# Patient Record
Sex: MALE | Race: WHITE | NOT HISPANIC OR LATINO | URBAN - METROPOLITAN AREA
[De-identification: names, ages, dates, MRNs, and addresses within clinical notes are randomized per-mention and may not be internally consistent; named-entity substitution may affect disease eponyms.]

---

## 2018-03-21 ENCOUNTER — INPATIENT (INPATIENT)
Facility: HOSPITAL | Age: 68
LOS: 0 days | Discharge: HOME | DRG: 352 | End: 2018-03-22
Attending: SPECIALIST | Admitting: SPECIALIST
Payer: COMMERCIAL

## 2018-03-21 VITALS
RESPIRATION RATE: 16 BRPM | OXYGEN SATURATION: 98 % | SYSTOLIC BLOOD PRESSURE: 108 MMHG | HEART RATE: 63 BPM | WEIGHT: 184.97 LBS | HEIGHT: 71 IN | DIASTOLIC BLOOD PRESSURE: 70 MMHG | TEMPERATURE: 99 F

## 2018-03-21 DIAGNOSIS — J44.9 CHRONIC OBSTRUCTIVE PULMONARY DISEASE, UNSPECIFIED: ICD-10-CM

## 2018-03-21 DIAGNOSIS — K40.30 UNILATERAL INGUINAL HERNIA, WITH OBSTRUCTION, WITHOUT GANGRENE, NOT SPECIFIED AS RECURRENT: ICD-10-CM

## 2018-03-21 DIAGNOSIS — K59.00 CONSTIPATION, UNSPECIFIED: ICD-10-CM

## 2018-03-21 DIAGNOSIS — Z98.890 OTHER SPECIFIED POSTPROCEDURAL STATES: Chronic | ICD-10-CM

## 2018-03-21 DIAGNOSIS — F20.3 UNDIFFERENTIATED SCHIZOPHRENIA: ICD-10-CM

## 2018-03-21 DIAGNOSIS — N40.0 BENIGN PROSTATIC HYPERPLASIA WITHOUT LOWER URINARY TRACT SYMPTOMS: ICD-10-CM

## 2018-03-21 DIAGNOSIS — K21.9 GASTRO-ESOPHAGEAL REFLUX DISEASE WITHOUT ESOPHAGITIS: ICD-10-CM

## 2018-03-21 DIAGNOSIS — J34.2 DEVIATED NASAL SEPTUM: Chronic | ICD-10-CM

## 2018-03-21 LAB
ALBUMIN SERPL ELPH-MCNC: 4.1 G/DL — SIGNIFICANT CHANGE UP (ref 3.3–5)
ALP SERPL-CCNC: 77 U/L — SIGNIFICANT CHANGE UP (ref 40–120)
ALT FLD-CCNC: 18 U/L — SIGNIFICANT CHANGE UP (ref 12–78)
ANION GAP SERPL CALC-SCNC: 9 MMOL/L — SIGNIFICANT CHANGE UP (ref 5–17)
APTT BLD: 30.4 SEC — SIGNIFICANT CHANGE UP (ref 27.5–37.4)
AST SERPL-CCNC: 22 U/L — SIGNIFICANT CHANGE UP (ref 15–37)
BASOPHILS # BLD AUTO: 0.2 K/UL — SIGNIFICANT CHANGE UP (ref 0–0.2)
BASOPHILS NFR BLD AUTO: 2 % — SIGNIFICANT CHANGE UP (ref 0–2)
BILIRUB SERPL-MCNC: 0.7 MG/DL — SIGNIFICANT CHANGE UP (ref 0.2–1.2)
BUN SERPL-MCNC: 31 MG/DL — HIGH (ref 7–23)
CALCIUM SERPL-MCNC: 9.2 MG/DL — SIGNIFICANT CHANGE UP (ref 8.5–10.1)
CHLORIDE SERPL-SCNC: 105 MMOL/L — SIGNIFICANT CHANGE UP (ref 96–108)
CO2 SERPL-SCNC: 26 MMOL/L — SIGNIFICANT CHANGE UP (ref 22–31)
CREAT SERPL-MCNC: 0.81 MG/DL — SIGNIFICANT CHANGE UP (ref 0.5–1.3)
EOSINOPHIL # BLD AUTO: 0.6 K/UL — HIGH (ref 0–0.5)
EOSINOPHIL NFR BLD AUTO: 7.3 % — HIGH (ref 0–6)
GLUCOSE SERPL-MCNC: 84 MG/DL — SIGNIFICANT CHANGE UP (ref 70–99)
HCT VFR BLD CALC: 38.2 % — LOW (ref 39–50)
HGB BLD-MCNC: 12 G/DL — LOW (ref 13–17)
INR BLD: 0.94 RATIO — SIGNIFICANT CHANGE UP (ref 0.88–1.16)
LACTATE SERPL-SCNC: 1.5 MMOL/L — SIGNIFICANT CHANGE UP (ref 0.7–2)
LIDOCAIN IGE QN: 162 U/L — SIGNIFICANT CHANGE UP (ref 73–393)
LYMPHOCYTES # BLD AUTO: 2.6 K/UL — SIGNIFICANT CHANGE UP (ref 1–3.3)
LYMPHOCYTES # BLD AUTO: 31.4 % — SIGNIFICANT CHANGE UP (ref 13–44)
MCHC RBC-ENTMCNC: 31.3 GM/DL — LOW (ref 32–36)
MCHC RBC-ENTMCNC: 31.8 PG — SIGNIFICANT CHANGE UP (ref 27–34)
MCV RBC AUTO: 101.6 FL — HIGH (ref 80–100)
MONOCYTES # BLD AUTO: 0.9 K/UL — SIGNIFICANT CHANGE UP (ref 0–0.9)
MONOCYTES NFR BLD AUTO: 11 % — HIGH (ref 1–9)
NEUTROPHILS # BLD AUTO: 4 K/UL — SIGNIFICANT CHANGE UP (ref 1.8–7.4)
NEUTROPHILS NFR BLD AUTO: 48.4 % — SIGNIFICANT CHANGE UP (ref 43–77)
PLATELET # BLD AUTO: 221 K/UL — SIGNIFICANT CHANGE UP (ref 150–400)
POTASSIUM SERPL-MCNC: 4.6 MMOL/L — SIGNIFICANT CHANGE UP (ref 3.5–5.3)
POTASSIUM SERPL-SCNC: 4.6 MMOL/L — SIGNIFICANT CHANGE UP (ref 3.5–5.3)
PROT SERPL-MCNC: 7.7 G/DL — SIGNIFICANT CHANGE UP (ref 6–8.3)
PROTHROM AB SERPL-ACNC: 10.2 SEC — SIGNIFICANT CHANGE UP (ref 9.8–12.7)
RBC # BLD: 3.76 M/UL — LOW (ref 4.2–5.8)
RBC # FLD: 16.3 % — HIGH (ref 10.3–14.5)
SODIUM SERPL-SCNC: 140 MMOL/L — SIGNIFICANT CHANGE UP (ref 135–145)
WBC # BLD: 8.2 K/UL — SIGNIFICANT CHANGE UP (ref 3.8–10.5)
WBC # FLD AUTO: 8.2 K/UL — SIGNIFICANT CHANGE UP (ref 3.8–10.5)

## 2018-03-21 PROCEDURE — 99285 EMERGENCY DEPT VISIT HI MDM: CPT

## 2018-03-21 PROCEDURE — 71045 X-RAY EXAM CHEST 1 VIEW: CPT | Mod: 26

## 2018-03-21 PROCEDURE — 49521 REREPAIR ING HERNIA BLOCKED: CPT | Mod: AS

## 2018-03-21 PROCEDURE — 74176 CT ABD & PELVIS W/O CONTRAST: CPT | Mod: 26

## 2018-03-21 PROCEDURE — 93010 ELECTROCARDIOGRAM REPORT: CPT

## 2018-03-21 PROCEDURE — 49521 REREPAIR ING HERNIA BLOCKED: CPT | Mod: GC

## 2018-03-21 RX ORDER — TOPIRAMATE 25 MG
100 TABLET ORAL
Qty: 0 | Refills: 0 | Status: DISCONTINUED | OUTPATIENT
Start: 2018-03-21 | End: 2018-03-22

## 2018-03-21 RX ORDER — FAMOTIDINE 10 MG/ML
40 INJECTION INTRAVENOUS AT BEDTIME
Qty: 0 | Refills: 0 | Status: DISCONTINUED | OUTPATIENT
Start: 2018-03-21 | End: 2018-03-22

## 2018-03-21 RX ORDER — SODIUM CHLORIDE 9 MG/ML
1000 INJECTION, SOLUTION INTRAVENOUS
Qty: 0 | Refills: 0 | Status: DISCONTINUED | OUTPATIENT
Start: 2018-03-21 | End: 2018-03-21

## 2018-03-21 RX ORDER — HYDROMORPHONE HYDROCHLORIDE 2 MG/ML
0.5 INJECTION INTRAMUSCULAR; INTRAVENOUS; SUBCUTANEOUS
Qty: 0 | Refills: 0 | Status: DISCONTINUED | OUTPATIENT
Start: 2018-03-21 | End: 2018-03-21

## 2018-03-21 RX ORDER — ENOXAPARIN SODIUM 100 MG/ML
40 INJECTION SUBCUTANEOUS DAILY
Qty: 0 | Refills: 0 | Status: DISCONTINUED | OUTPATIENT
Start: 2018-03-22 | End: 2018-03-22

## 2018-03-21 RX ORDER — ACETAMINOPHEN 500 MG
1000 TABLET ORAL ONCE
Qty: 0 | Refills: 0 | Status: COMPLETED | OUTPATIENT
Start: 2018-03-22 | End: 2018-03-22

## 2018-03-21 RX ORDER — OLANZAPINE 15 MG/1
20 TABLET, FILM COATED ORAL AT BEDTIME
Qty: 0 | Refills: 0 | Status: DISCONTINUED | OUTPATIENT
Start: 2018-03-21 | End: 2018-03-22

## 2018-03-21 RX ORDER — KETOROLAC TROMETHAMINE 30 MG/ML
15 SYRINGE (ML) INJECTION EVERY 6 HOURS
Qty: 0 | Refills: 0 | Status: DISCONTINUED | OUTPATIENT
Start: 2018-03-21 | End: 2018-03-22

## 2018-03-21 RX ORDER — ONDANSETRON 8 MG/1
4 TABLET, FILM COATED ORAL EVERY 6 HOURS
Qty: 0 | Refills: 0 | Status: DISCONTINUED | OUTPATIENT
Start: 2018-03-21 | End: 2018-03-22

## 2018-03-21 RX ORDER — OXYCODONE HYDROCHLORIDE 5 MG/1
10 TABLET ORAL EVERY 6 HOURS
Qty: 0 | Refills: 0 | Status: DISCONTINUED | OUTPATIENT
Start: 2018-03-21 | End: 2018-03-21

## 2018-03-21 RX ORDER — TAMSULOSIN HYDROCHLORIDE 0.4 MG/1
0.4 CAPSULE ORAL AT BEDTIME
Qty: 0 | Refills: 0 | Status: DISCONTINUED | OUTPATIENT
Start: 2018-03-21 | End: 2018-03-22

## 2018-03-21 RX ORDER — ACETAMINOPHEN 500 MG
1000 TABLET ORAL ONCE
Qty: 0 | Refills: 0 | Status: COMPLETED | OUTPATIENT
Start: 2018-03-21 | End: 2018-03-21

## 2018-03-21 RX ORDER — PANTOPRAZOLE SODIUM 20 MG/1
40 TABLET, DELAYED RELEASE ORAL ONCE
Qty: 0 | Refills: 0 | Status: COMPLETED | OUTPATIENT
Start: 2018-03-21 | End: 2018-03-21

## 2018-03-21 RX ORDER — PIPERACILLIN AND TAZOBACTAM 4; .5 G/20ML; G/20ML
3.38 INJECTION, POWDER, LYOPHILIZED, FOR SOLUTION INTRAVENOUS ONCE
Qty: 0 | Refills: 0 | Status: DISCONTINUED | OUTPATIENT
Start: 2018-03-21 | End: 2018-03-21

## 2018-03-21 RX ORDER — OXYCODONE HYDROCHLORIDE 5 MG/1
5 TABLET ORAL EVERY 4 HOURS
Qty: 0 | Refills: 0 | Status: DISCONTINUED | OUTPATIENT
Start: 2018-03-21 | End: 2018-03-21

## 2018-03-21 RX ORDER — MECLIZINE HCL 12.5 MG
12.5 TABLET ORAL DAILY
Qty: 0 | Refills: 0 | Status: DISCONTINUED | OUTPATIENT
Start: 2018-03-21 | End: 2018-03-22

## 2018-03-21 RX ORDER — DOCUSATE SODIUM 100 MG
100 CAPSULE ORAL
Qty: 0 | Refills: 0 | Status: DISCONTINUED | OUTPATIENT
Start: 2018-03-21 | End: 2018-03-22

## 2018-03-21 RX ORDER — OLANZAPINE 15 MG/1
10 TABLET, FILM COATED ORAL DAILY
Qty: 0 | Refills: 0 | Status: DISCONTINUED | OUTPATIENT
Start: 2018-03-21 | End: 2018-03-22

## 2018-03-21 RX ORDER — SENNA PLUS 8.6 MG/1
2 TABLET ORAL AT BEDTIME
Qty: 0 | Refills: 0 | Status: DISCONTINUED | OUTPATIENT
Start: 2018-03-21 | End: 2018-03-22

## 2018-03-21 RX ORDER — PIPERACILLIN AND TAZOBACTAM 4; .5 G/20ML; G/20ML
3.38 INJECTION, POWDER, LYOPHILIZED, FOR SOLUTION INTRAVENOUS ONCE
Qty: 0 | Refills: 0 | Status: COMPLETED | OUTPATIENT
Start: 2018-03-21 | End: 2018-03-21

## 2018-03-21 RX ADMIN — SENNA PLUS 2 TABLET(S): 8.6 TABLET ORAL at 21:45

## 2018-03-21 RX ADMIN — Medication 15 MILLIGRAM(S): at 23:54

## 2018-03-21 RX ADMIN — Medication 100 MILLIGRAM(S): at 21:45

## 2018-03-21 RX ADMIN — OLANZAPINE 10 MILLIGRAM(S): 15 TABLET, FILM COATED ORAL at 14:18

## 2018-03-21 RX ADMIN — TAMSULOSIN HYDROCHLORIDE 0.4 MILLIGRAM(S): 0.4 CAPSULE ORAL at 14:20

## 2018-03-21 RX ADMIN — Medication 400 MILLIGRAM(S): at 20:07

## 2018-03-21 RX ADMIN — Medication 15 MILLIGRAM(S): at 17:22

## 2018-03-21 RX ADMIN — Medication 100 MILLIGRAM(S): at 17:32

## 2018-03-21 RX ADMIN — PIPERACILLIN AND TAZOBACTAM 200 GRAM(S): 4; .5 INJECTION, POWDER, LYOPHILIZED, FOR SOLUTION INTRAVENOUS at 09:35

## 2018-03-21 RX ADMIN — SODIUM CHLORIDE 100 MILLILITER(S): 9 INJECTION, SOLUTION INTRAVENOUS at 11:56

## 2018-03-21 RX ADMIN — HYDROMORPHONE HYDROCHLORIDE 0.5 MILLIGRAM(S): 2 INJECTION INTRAMUSCULAR; INTRAVENOUS; SUBCUTANEOUS at 12:34

## 2018-03-21 RX ADMIN — OLANZAPINE 20 MILLIGRAM(S): 15 TABLET, FILM COATED ORAL at 21:45

## 2018-03-21 RX ADMIN — Medication 100 MILLIGRAM(S): at 14:18

## 2018-03-21 RX ADMIN — FAMOTIDINE 40 MILLIGRAM(S): 10 INJECTION INTRAVENOUS at 21:45

## 2018-03-21 RX ADMIN — Medication 1000 MILLIGRAM(S): at 20:22

## 2018-03-21 RX ADMIN — Medication 15 MILLIGRAM(S): at 16:10

## 2018-03-21 NOTE — H&P ADULT - NSHPPHYSICALEXAM_GEN_ALL_CORE
PHYSICAL EXAM:  GENERAL: NAD, well-groomed, well-developed  HEAD:  Atraumatic, Normocephalic    HEART: Regular rate and rhythm; (+) ejection murmur- RSB, LSB, no rubs, or gallops  RESPIRATORY: CTA B/L, No W/R/R  ABDOMEN: Soft, Nontender, Nondistended; Bowel sounds present  NEUROLOGY: A&Ox3  EXTREMITIES:  2+ Peripheral Pulses, No clubbing, cyanosis, or edema  SKIN: warm, dry, normal color, no rash or abnormal lesions PHYSICAL EXAM:  GENERAL: NAD, well-groomed, well-developed  HEAD:  Atraumatic, Normocephalic  HEART: Regular rate and rhythm; (+) ejection murmur- RSB, LSB, no rubs, or gallops  RESPIRATORY: CTA B/L, No W/R/R  ABDOMEN: Soft, Nontender, Nondistended; Bowel sounds present.  GROIN: (+) bulge/incarcerated Right inguinal hernia, irreducible. (+) ttp to Right inguinal region. Left groin appears normal.  Calves soft bilaterally, no ttp.  NEUROLOGY: A&Ox3  EXTREMITIES:  2+ Peripheral Pulses, No clubbing, cyanosis, or edema  SKIN: warm, dry, normal color, no rash or abnormal lesions

## 2018-03-21 NOTE — CONSULT NOTE ADULT - SUBJECTIVE AND OBJECTIVE BOX
AranzaWai kent is a 67 Y.O. M transferred form [facility] rehab because of right inguina non reducible lump with pain to [place] ED.   Patient has incarcerated Right inguinal hernia.  Patient reports he had Right inguinal hernia repair ~4-5 years ago at [facility]. Patient reports he has had worsening pain over the past week. Patient denies any bowel or bladder changes but does report a history of chronic constipation. Patient denies any nausea/vomiting. Denies any recent CP, SOB, Fever/chills/dysuria. Patient was admitted for "PNA vs. CP" to [facility] in Jan 2018 and was on a "vent". Pt unsure (poor historian) if cardiac imaging or interventional studies were performed.   PMH significant for Schizophrenia, BPH, GERD, Ischemic Heart disease, Respiratory failure, COPD.  He is on O2 and inhalers PRN basis.

## 2018-03-21 NOTE — CONSULT NOTE ADULT - ATTENDING COMMENTS
Patient is mild to moderate cardiac risk candidate for surgical procedure and cleared for emergent surgery.

## 2018-03-21 NOTE — ED PROVIDER NOTE - GASTROINTESTINAL, MLM
Abdomen soft, non-tender, no guarding. +right bulging inguinal hernia with mild erythema, irreducible

## 2018-03-21 NOTE — H&P ADULT - HISTORY OF PRESENT ILLNESS
67 Y.O. M with PMHx significant for Schizophrenia, BPH, GERD, Ischemic Heart disease, Respiratory failure, COPD- on O2 prn/inhalers presently- presents to [place] ED with incarcerated Right inguinal hernia. Pt reports he had Right inguinal hernia repair ~4-5 years ago at [facility]. Pt reports he has had worsening pain over the past week. Pt denies any bowel or bladder changes but does report a history of chronic constipation. Pt denies any nausea/vomiting. Denies any recent CP, SOB, Fever/chills/dysuria. Pt reports he was admitted for "PNA vs. CP" to [facility] in Jan 2018 and was on a "vent". Pt unsure (poor historian) if cardiac imaging or interventional studies were performed.     PMD- Dr. Cheikh Rivas [phone] 67 Y.O. M with PMHx significant for Schizophrenia, BPH, GERD, Ischemic Heart disease, Respiratory failure, COPD- on O2 prn/inhalers presently- presents to [place] ED with incarcerated Right inguinal hernia. Pt reports he had Right inguinal hernia repair ~4-5 years ago at [facility]. Pt reports he has had worsening pain over the past week. Pt denies any bowel or bladder changes but does report a history of chronic constipation. Pt denies any nausea/vomiting. Denies any recent CP, SOB, Fever/chills/dysuria. Pt reports he was admitted for "PNA vs. CP" to [facility] in Jan 2018 and was on a "vent". Pt unsure (poor historian) if cardiac imaging or interventional studies were performed.  Pt lives in residential facility [facility].    PMD- Dr. Cheikh Rivas [phone] 67 Y.O. M with PMHx significant for Schizophrenia, BPH, GERD, Ischemic Heart disease, Respiratory failure, COPD- on O2 prn but no inhalers presently- presents to [place] ED with incarcerated Right inguinal hernia. Pt reports he had Right inguinal hernia repair ~4-5 years ago at [facility]. Pt reports he has had worsening pain over the past week. Pt denies any bowel or bladder changes but does report a history of chronic constipation. Pt denies any nausea/vomiting. Denies any recent CP, SOB, Fever/chills/dysuria. Pt reports he was admitted for "PNA vs. CP" to [facility] in Jan 2018 and was on a "vent". Pt unsure (poor historian) if cardiac imaging or interventional studies were performed.  Pt lives in residential facility [facility].    PMD- Dr. Cheikh Rivas [phone]

## 2018-03-21 NOTE — CONSULT NOTE ADULT - PROBLEM SELECTOR RECOMMENDATION 9
Surgical admission and Patient is mild to moderate cardiac risk candidate for surgical procedure and cleared for emergent surgery.

## 2018-03-21 NOTE — H&P ADULT - PSH
Deviated septum    H/O hernia repair  right inguinal & ? left  History of excision of pilonidal cyst    History of tonsillectomy

## 2018-03-21 NOTE — ED PROVIDER NOTE - OBJECTIVE STATEMENT
68 yo male hx of right inguinal hernia (had surgery 5-6 years ago but doesn't remember surgeon name from [place]) sent from [facility] c/o right inguinal pain x 1 week getting progressively worse.  No fever/chills, no nausea/vomiting, worse with standing.  Noticed a bulge a few days ago.

## 2018-03-21 NOTE — H&P ADULT - PMH
BPH (benign prostatic hyperplasia)    CAD (coronary artery disease)    Constipation    COPD (chronic obstructive pulmonary disease)    GERD (gastroesophageal reflux disease)    Inguinal hernia    Respiratory failure    Schizophrenia    Vertigo

## 2018-03-21 NOTE — BRIEF OPERATIVE NOTE - POST-OP DX
Inguinal hernia of right side with obstruction  03/21/2018  INCARCERATED CECUM  Active  Guadalupe Fan

## 2018-03-21 NOTE — BRIEF OPERATIVE NOTE - PROCEDURE
<<-----Click on this checkbox to enter Procedure Inguinal hernia repair, right  03/21/2018  WITH MESH AND PLUG  Active  HNOOROLLAH  Laparotomy  03/21/2018  EXPLORATION AND REDUCTION INCARCERATED CECUM, REPAIR SEROSAL TEAR OF CECUM  Active  HNOOROLLAH

## 2018-03-21 NOTE — H&P ADULT - NSHPREVIEWOFSYSTEMS_GEN_ALL_CORE
REVIEW OF SYSTEM:  CONSTITUTIONAL: No weakness, fevers or chills  EYES/ENT: No visual changes;  No vertigo or throat pain.  NECK: No pain or stiffness  RESPIRATORY: No cough, wheezing, hemoptysis; No shortness of breath  CARDIOVASCULAR: No chest pain or palpitations  GASTROINTESTINAL: No abdominal or epigastric pain. No nausea, vomiting, or hematemesis; No diarrhea. (+) constipation. No melena or hematochezia.  GENITOURINARY: No dysuria, frequency or hematuria  GROIN: (+) bulge/incarcerated Right inguinal hernia, irreducible. (+) ttp to Right inguinal region. Left groin appears normal.  Calves soft bilaterally, no ttp. REVIEW OF SYSTEM:  CONSTITUTIONAL: No weakness, fevers or chills  EYES/ENT: No visual changes;  No vertigo or throat pain.  NECK: No pain or stiffness  RESPIRATORY: No cough, wheezing, hemoptysis; No shortness of breath  CARDIOVASCULAR: No chest pain or palpitations  GASTROINTESTINAL: No abdominal or epigastric pain. No nausea, vomiting, or hematemesis; No diarrhea. (+) constipation. No melena or hematochezia.  GENITOURINARY: No dysuria, frequency or hematuria

## 2018-03-21 NOTE — H&P ADULT - ASSESSMENT
67 Y.O. PMHX as above with Right incarcerated inguinal hernia to the OR with Dr. Magdaleno  1) NPO w/IVF  2) Ancef 2gm PREOP

## 2018-03-21 NOTE — CONSULT NOTE ADULT - ASSESSMENT
Patient A-O X 3, he walks with walker.   Denied any chest pain and exertional dyspnea.  The [facility] for unresponsive ness.  According to the patient he was intubated and he was on ventilator.   According to the patient one of doctor there told him that he may have slight MI but he does not recall that he had any cardiac cath or stress test.  Patient was transferred to [facility] rehabilitation [facility].  Patient is mild to moderate cardiac risk candidate for surgical procedure and cleared for emergent surgery.

## 2018-03-21 NOTE — BRIEF OPERATIVE NOTE - OPERATION/FINDINGS
INCARCERATED CECUM IN RIGHT INGUINA DEFECT , SMALL CECAL SEROSAL TEAR,   DIRECT AND INDIRECT RIGHT INGUINAL HERNIA

## 2018-03-21 NOTE — PROGRESS NOTE ADULT - SUBJECTIVE AND OBJECTIVE BOX
Transversis Abdominal Plane Block Note:    Consent obtained preop, site marked.      Risks, benefits and alternatives discussed. Risks included but were not limited to infection, local anesthetic toxicity, permanent or temporary nerve damage or injury, failed or partial block, catheter dislodgement (if one used,) bleeding, vascular injury, hematoma.    Patient verbally stated they understood and agreed to proceed with the block.      Procedure:    Time out performed, sterile abdominal prep with chlorhexidine and drape,  ultrasound-guided, 21G 4" stimuplex needle advanced towards  _Right____ mid-axiallary line, 1-2 cm superior to ileac crest; good visualization of needle and muscles at all times;  20 cc of 0..5% Ropivacaine with 5mg of decadron injected easily, no heme after aspirating every 5cc, no intraneural injection, no paresthesia, good visualization of local anesthetic spread.  Procedure well tolerated without complications.

## 2018-03-21 NOTE — ED ADULT NURSE NOTE - OBJECTIVE STATEMENT
Pt was sent by [facility] for evaluation of right inguinal hernia - states its been bothering him for about 1 week worsening yesterday

## 2018-03-21 NOTE — ED ADULT NURSE NOTE - PMH
BPH (benign prostatic hyperplasia)    CAD (coronary artery disease)    Constipation    GERD (gastroesophageal reflux disease)    Inguinal hernia    Respiratory failure    Schizophrenia    Vertigo

## 2018-03-22 VITALS
HEART RATE: 68 BPM | OXYGEN SATURATION: 97 % | TEMPERATURE: 98 F | SYSTOLIC BLOOD PRESSURE: 92 MMHG | DIASTOLIC BLOOD PRESSURE: 48 MMHG | RESPIRATION RATE: 16 BRPM

## 2018-03-22 DIAGNOSIS — F25.0 SCHIZOAFFECTIVE DISORDER, BIPOLAR TYPE: ICD-10-CM

## 2018-03-22 PROCEDURE — 85027 COMPLETE CBC AUTOMATED: CPT

## 2018-03-22 PROCEDURE — 99285 EMERGENCY DEPT VISIT HI MDM: CPT | Mod: 25

## 2018-03-22 PROCEDURE — 86901 BLOOD TYPING SEROLOGIC RH(D): CPT

## 2018-03-22 PROCEDURE — 93005 ELECTROCARDIOGRAM TRACING: CPT

## 2018-03-22 PROCEDURE — 83690 ASSAY OF LIPASE: CPT

## 2018-03-22 PROCEDURE — C1781: CPT

## 2018-03-22 PROCEDURE — 86850 RBC ANTIBODY SCREEN: CPT

## 2018-03-22 PROCEDURE — 80053 COMPREHEN METABOLIC PANEL: CPT

## 2018-03-22 PROCEDURE — 83605 ASSAY OF LACTIC ACID: CPT

## 2018-03-22 PROCEDURE — 71045 X-RAY EXAM CHEST 1 VIEW: CPT

## 2018-03-22 PROCEDURE — 82962 GLUCOSE BLOOD TEST: CPT

## 2018-03-22 PROCEDURE — 85730 THROMBOPLASTIN TIME PARTIAL: CPT

## 2018-03-22 PROCEDURE — 49521 REREPAIR ING HERNIA BLOCKED: CPT

## 2018-03-22 PROCEDURE — 85610 PROTHROMBIN TIME: CPT

## 2018-03-22 PROCEDURE — 74176 CT ABD & PELVIS W/O CONTRAST: CPT

## 2018-03-22 PROCEDURE — 86900 BLOOD TYPING SEROLOGIC ABO: CPT

## 2018-03-22 RX ORDER — DOCUSATE SODIUM 100 MG
100 CAPSULE ORAL THREE TIMES A DAY
Qty: 0 | Refills: 0 | Status: DISCONTINUED | OUTPATIENT
Start: 2018-03-22 | End: 2018-03-22

## 2018-03-22 RX ADMIN — Medication 100 MILLIGRAM(S): at 06:51

## 2018-03-22 RX ADMIN — ENOXAPARIN SODIUM 40 MILLIGRAM(S): 100 INJECTION SUBCUTANEOUS at 12:23

## 2018-03-22 RX ADMIN — OLANZAPINE 10 MILLIGRAM(S): 15 TABLET, FILM COATED ORAL at 06:51

## 2018-03-22 RX ADMIN — Medication 1 TABLET(S): at 06:51

## 2018-03-22 RX ADMIN — Medication 15 MILLIGRAM(S): at 00:09

## 2018-03-22 RX ADMIN — Medication 12.5 MILLIGRAM(S): at 06:51

## 2018-03-22 RX ADMIN — Medication 1000 MILLIGRAM(S): at 04:32

## 2018-03-22 RX ADMIN — Medication 400 MILLIGRAM(S): at 04:17

## 2018-03-22 RX ADMIN — TAMSULOSIN HYDROCHLORIDE 0.4 MILLIGRAM(S): 0.4 CAPSULE ORAL at 12:22

## 2018-03-22 NOTE — PROGRESS NOTE ADULT - SUBJECTIVE AND OBJECTIVE BOX
Post Op Day 1 of Anesthesia Pain Management Service    SUBJECTIVE: doing well  		  OBJECTIVE:   Pain Score: 0  /10  Therapy:	[ ] IV PCA	[ ] Epidural   [ ] s/p Spinal Opioid	[x ] Peripheral nerve block (TAP)  	  Vital Signs Last 24 Hrs  T(C): 36.7 (22 Mar 2018 07:11), Max: 36.9 (21 Mar 2018 23:58)  T(F): 98.1 (22 Mar 2018 07:11), Max: 98.4 (21 Mar 2018 23:58)  HR: 68 (22 Mar 2018 07:11) (68 - 90)  BP: 92/48 (22 Mar 2018 07:11) (92/48 - 171/66)  BP(mean): --  RR: 16 (22 Mar 2018 07:11) (12 - 17)  SpO2: 97% (22 Mar 2018 07:11) (96% - 100%)    (x ) Alert & Oriented     (x ) No motor/sensory block     ( -) Nausea     (- ) Pruritis     (- ) Headache      (x ) Further Pain Rx Plan:  Oral Pain Medications prn

## 2018-03-22 NOTE — DISCHARGE NOTE ADULT - HOSPITAL COURSE
67 Y.O. M who presented to [place] ED with Right groin pain. Examination revealed incarcerated inguinal hernia and CT Abd/pelvis revealed hernia containing bowel (cecum) with suspicion for compromise of bowel integrity. Pt was taken to the OR emergently and exploratory laparotomy was performed, cecum was reduced back into the abdomen but was found to be viable. Right inguinal hernia was repaired with mesh. Postop, pt received DVT prophylaxis with lovenox. Pt received adequate analgesia. Pt was given prophylactic antibiotics per protocol. Pt remained hemodynamically stable throughout hospital stay and vital signs were stable. Pt is stable for discharge to [facility] facility with follow up with Dr. Magdaleno in 1 week and Dr. Jj for outpatient colonoscopy. 67 Y.O. M who presented to [place] ED with intractable Right groin pain and buldge. Examination revealed incarcerated inguinal hernia and CT Abd/pelvis revealed hernia containing bowel (cecum) with suspicion for compromise of bowel integrity/possible perforation. Pt was taken to the OR emergently and exploratory laparotomy was performed, cecum was reduced back into the abdomen but was found to be viable. Right inguinal hernia was repaired with mesh. Postop, pt received DVT prophylaxis with lovenox. Pt received adequate analgesia. Pt was given prophylactic antibiotics per protocol. Pt remained hemodynamically stable throughout hospital stay and vital signs were stable. Pt is stable for discharge to [facility] facility with follow up with Dr. Magdaleno in 1 week and Dr. Jj for outpatient colonoscopy.

## 2018-03-22 NOTE — DISCHARGE NOTE ADULT - NSTOBACCOHOTLINE_GEN_A_CS
[facility] Smokers Quitline ([phone]) [facility] Smokers Quitline ([phone])

## 2018-03-22 NOTE — PROGRESS NOTE ADULT - SUBJECTIVE AND OBJECTIVE BOX
RAND OLMOS  MRN-[id number] 67y    GENERAL SURGERY/ DR. SYKES    NO N/V, VOIDING, + FLATUS , NO BM   TOLERATED FULL LIQUID DIET     Vital Signs Last 24 Hrs  T(C): 36.9 (21 Mar 2018 23:58), Max: 37.3 (21 Mar 2018 06:37)  T(F): 98.4 (21 Mar 2018 23:58), Max: 99.2 (21 Mar 2018 06:37)  HR: 70 (21 Mar 2018 23:58) (63 - 90)  BP: 114/70 (21 Mar 2018 23:58) (99/63 - 171/66)  BP(mean): --  RR: 15 (21 Mar 2018 23:58) (12 - 17)  SpO2: 97% (21 Mar 2018 23:58) (96% - 100%)    POD # 1      LUNGS:         CLEAR TO AUSCULTATION , NO W/R/R  ABDOMEN    RLQ AND RIGHT GROIN INCISION ARE DRY AND INTACT                       SOME EDEMA OVER RIGHT GROIN                      + BS, SOFT, NON DISTENDED, SOME INCISIONAL TENDERNESS   EXTREMITY:  NO EDEMA, NO CALF TENDERNESS                               ASSESSMENT &  PLAN:  POD # 1 S/P EX LAP, REDUCTION INCARCERATED CECUM, REPAIR CECAL SEROSAL TEAR, REPAIR RIGHT INGUINAL HERNIA WITH MESH     OOB, AMBULATE  ADVANCE TO REGULAR DIET   D/C PLAN HOME IN AM   SOCIAL SERVICE CONSULT FOR D/C PLAN

## 2018-03-22 NOTE — DISCHARGE NOTE ADULT - CARE PROVIDERS DIRECT ADDRESSES
,[email or web address],DirectAddress_Unknown

## 2018-03-22 NOTE — DISCHARGE NOTE ADULT - MEDICATION SUMMARY - MEDICATIONS TO TAKE
I will START or STAY ON the medications listed below when I get home from the hospital:    Ensure  -- orally daily for supplementation  -- Indication: For Home supplement    tamsulosin 0.4 mg oral capsule  -- 1 cap(s) by mouth once a day  -- Indication: For Home med    topiramate 100 mg oral tablet  -- 1 tab(s) by mouth 2 times a day  -- Indication: For Home med    Antivert 12.5 mg oral tablet  -- 1 tab(s) by mouth once a day  -- Indication: For Home med    ZyPREXA 10 mg oral tablet  -- 1 tab(s) by mouth once a day  -- Indication: For Home med    ZyPREXA 10 mg oral tablet  -- 2 tab(s) by mouth once a day (at bedtime)  -- Indication: For Home med    famotidine 40 mg oral tablet  -- 1 tab(s) by mouth once a day (at bedtime)  -- Indication: For Home med    Colace 100 mg oral capsule  -- 1 cap(s) by mouth 2 times a day  -- Indication: For Home med    Senna 8.6 mg oral tablet  -- 2 tab(s) by mouth once a day (at bedtime)  -- Indication: For Home med    Multiple Vitamins oral tablet  -- 1 tab(s) by mouth once a day  -- Indication: For Home med

## 2018-03-22 NOTE — DISCHARGE NOTE ADULT - PATIENT PORTAL LINK FT
You can access the [facility] Patient Portal, offered by [facility], by registering with the following website: [email or web address]

## 2018-03-22 NOTE — BEHAVIORAL HEALTH ASSESSMENT NOTE - HPI (INCLUDE ILLNESS QUALITY, SEVERITY, DURATION, TIMING, CONTEXT, MODIFYING FACTORS, ASSOCIATED SIGNS AND SYMPTOMS)
67 Y.O. M with PMHx significant for Schizoaffective disorder, BPH, GERD, Ischemic Heart disease, Respiratory failure, COPD- on O2 prn but no inhalers presently- presents to [place] ED with incarcerated Right inguinal hernia. Pt reports he had Right inguinal hernia repair ~4-5 years ago at [facility]. Pt reports he has had worsening pain over the past week. Pt denies any bowel or bladder changes but does report a history of chronic constipation. Pt denies any nausea/vomiting. Denies any recent CP, SOB, Fever/chills/dysuria. Pt reports he was admitted for "PNA vs. CP" to [facility] in Jan 2018 and was on a "vent". Pt unsure (poor historian) if cardiac imaging or interventional studies were performed.  Pt lives in residential facility [facility]. Tanya underwent hernia surgery and is currently recuperating.  Patient reports long history of psychiatric illness for the last 50 years, currently stabilized on pharmacological regimen. He currently denies symptoms of psychosis, zoe or depression

## 2018-03-22 NOTE — DISCHARGE NOTE ADULT - NS AS ACTIVITY OBS
Walking-Indoors allowed/Do not make important decisions/Do not drive or operate machinery/Walking-Outdoors allowed/Stairs allowed/Showering allowed/No Heavy lifting/straining

## 2018-03-22 NOTE — DISCHARGE NOTE ADULT - CARE PLAN
Principal Discharge DX:	Incarcerated inguinal hernia, unilateral  Goal:	Wound healing, analgesia, recovery from surgery  Assessment and plan of treatment:	Keep incision clean, dry and intact. You may shower as usual but do not scrub or soak incision sites. No tub baths. No hot tubs. No swimming pools. No heavy lifting or straining for 6 weeks.    1) Call Dr. Magdaleno's office for an appointment for follow up in 1 week.   2) Call Dr. Jj' office for an appointment to consultation for colonoscopy as an outpatient.

## 2018-03-22 NOTE — PROGRESS NOTE ADULT - SUBJECTIVE AND OBJECTIVE BOX
The patient was interviewed and evaluated  67y Male    T(C): 36.7 (03-22-18 @ 07:11), Max: 36.9 (03-21-18 @ 23:58)  HR: 68 (03-22-18 @ 07:11) (68 - 90)  BP: 92/48 (03-22-18 @ 07:11) (92/48 - 171/66)  RR: 16 (03-22-18 @ 07:11) (12 - 17)  SpO2: 97% (03-22-18 @ 07:11) (96% - 100%)  Wt(kg): --    Pt seen, doing well, no anesthesia complications or complaints noted or reported.   No Nausea    All questions answered    No additional recommendations.     Pain well controlled

## 2018-03-22 NOTE — DISCHARGE NOTE ADULT - CARE PROVIDER_API CALL
Terrell Magdaleno), Surgery  [address]  [address] [place] [address]  Phone: [phone]  Fax: [phone]    Darin Jj (), Gastroenterology; Internal Medicine  [address] [place] [address]  Phone: [phone]  Fax: [phone]

## 2018-03-22 NOTE — DISCHARGE NOTE ADULT - PLAN OF CARE
Wound healing, analgesia, recovery from surgery Keep incision clean, dry and intact. You may shower as usual but do not scrub or soak incision sites. No tub baths. No hot tubs. No swimming pools. No heavy lifting or straining for 6 weeks.    1) Call Dr. Magdaleno's office for an appointment for follow up in 1 week.   2) Call Dr. Jj' office for an appointment to consultation for colonoscopy as an outpatient.

## 2020-01-28 ENCOUNTER — EMERGENCY (EMERGENCY)
Facility: HOSPITAL | Age: 70
LOS: 1 days | Discharge: PSYCHIATRIC OR SUBSTANCE USE FACILITY | End: 2020-01-28
Attending: EMERGENCY MEDICINE | Admitting: EMERGENCY MEDICINE
Payer: MEDICARE

## 2020-01-28 VITALS
HEIGHT: 66 IN | DIASTOLIC BLOOD PRESSURE: 96 MMHG | OXYGEN SATURATION: 96 % | HEART RATE: 82 BPM | SYSTOLIC BLOOD PRESSURE: 154 MMHG | TEMPERATURE: 98 F | RESPIRATION RATE: 18 BRPM | WEIGHT: 240.08 LBS

## 2020-01-28 VITALS
DIASTOLIC BLOOD PRESSURE: 68 MMHG | TEMPERATURE: 98 F | RESPIRATION RATE: 16 BRPM | OXYGEN SATURATION: 96 % | HEART RATE: 68 BPM | SYSTOLIC BLOOD PRESSURE: 114 MMHG

## 2020-01-28 DIAGNOSIS — Z98.890 OTHER SPECIFIED POSTPROCEDURAL STATES: Chronic | ICD-10-CM

## 2020-01-28 DIAGNOSIS — J34.2 DEVIATED NASAL SEPTUM: Chronic | ICD-10-CM

## 2020-01-28 DIAGNOSIS — F25.0 SCHIZOAFFECTIVE DISORDER, BIPOLAR TYPE: ICD-10-CM

## 2020-01-28 LAB
ALBUMIN SERPL ELPH-MCNC: 3.8 G/DL — SIGNIFICANT CHANGE UP (ref 3.3–5)
ALP SERPL-CCNC: 67 U/L — SIGNIFICANT CHANGE UP (ref 30–120)
ALT FLD-CCNC: 34 U/L DA — SIGNIFICANT CHANGE UP (ref 10–60)
ANION GAP SERPL CALC-SCNC: 8 MMOL/L — SIGNIFICANT CHANGE UP (ref 5–17)
APAP SERPL-MCNC: <1 UG/ML — LOW (ref 10–30)
APPEARANCE UR: CLEAR — SIGNIFICANT CHANGE UP
AST SERPL-CCNC: 26 U/L — SIGNIFICANT CHANGE UP (ref 10–40)
BASOPHILS # BLD AUTO: 0.11 K/UL — SIGNIFICANT CHANGE UP (ref 0–0.2)
BASOPHILS NFR BLD AUTO: 1.2 % — SIGNIFICANT CHANGE UP (ref 0–2)
BILIRUB SERPL-MCNC: 0.5 MG/DL — SIGNIFICANT CHANGE UP (ref 0.2–1.2)
BILIRUB UR-MCNC: NEGATIVE — SIGNIFICANT CHANGE UP
BUN SERPL-MCNC: 13 MG/DL — SIGNIFICANT CHANGE UP (ref 7–23)
CALCIUM SERPL-MCNC: 9.5 MG/DL — SIGNIFICANT CHANGE UP (ref 8.4–10.5)
CHLORIDE SERPL-SCNC: 102 MMOL/L — SIGNIFICANT CHANGE UP (ref 96–108)
CO2 SERPL-SCNC: 26 MMOL/L — SIGNIFICANT CHANGE UP (ref 22–31)
COLOR SPEC: YELLOW — SIGNIFICANT CHANGE UP
CREAT SERPL-MCNC: 0.65 MG/DL — SIGNIFICANT CHANGE UP (ref 0.5–1.3)
DIFF PNL FLD: ABNORMAL
EOSINOPHIL # BLD AUTO: 0.22 K/UL — SIGNIFICANT CHANGE UP (ref 0–0.5)
EOSINOPHIL NFR BLD AUTO: 2.4 % — SIGNIFICANT CHANGE UP (ref 0–6)
ETHANOL SERPL-MCNC: <3 MG/DL — SIGNIFICANT CHANGE UP (ref 0–3)
GLUCOSE SERPL-MCNC: 89 MG/DL — SIGNIFICANT CHANGE UP (ref 70–99)
GLUCOSE UR QL: NEGATIVE MG/DL — SIGNIFICANT CHANGE UP
HCT VFR BLD CALC: 37.9 % — LOW (ref 39–50)
HGB BLD-MCNC: 12.1 G/DL — LOW (ref 13–17)
IMM GRANULOCYTES NFR BLD AUTO: 0.2 % — SIGNIFICANT CHANGE UP (ref 0–1.5)
KETONES UR-MCNC: NEGATIVE — SIGNIFICANT CHANGE UP
LEUKOCYTE ESTERASE UR-ACNC: ABNORMAL
LYMPHOCYTES # BLD AUTO: 2.58 K/UL — SIGNIFICANT CHANGE UP (ref 1–3.3)
LYMPHOCYTES # BLD AUTO: 28.4 % — SIGNIFICANT CHANGE UP (ref 13–44)
MCHC RBC-ENTMCNC: 31 PG — SIGNIFICANT CHANGE UP (ref 27–34)
MCHC RBC-ENTMCNC: 31.9 GM/DL — LOW (ref 32–36)
MCV RBC AUTO: 97.2 FL — SIGNIFICANT CHANGE UP (ref 80–100)
MONOCYTES # BLD AUTO: 0.61 K/UL — SIGNIFICANT CHANGE UP (ref 0–0.9)
MONOCYTES NFR BLD AUTO: 6.7 % — SIGNIFICANT CHANGE UP (ref 2–14)
NEUTROPHILS # BLD AUTO: 5.53 K/UL — SIGNIFICANT CHANGE UP (ref 1.8–7.4)
NEUTROPHILS NFR BLD AUTO: 61.1 % — SIGNIFICANT CHANGE UP (ref 43–77)
NITRITE UR-MCNC: NEGATIVE — SIGNIFICANT CHANGE UP
NRBC # BLD: 0 /100 WBCS — SIGNIFICANT CHANGE UP (ref 0–0)
PCP SPEC-MCNC: SIGNIFICANT CHANGE UP
PH UR: 7 — SIGNIFICANT CHANGE UP (ref 5–8)
PLATELET # BLD AUTO: 304 K/UL — SIGNIFICANT CHANGE UP (ref 150–400)
POTASSIUM SERPL-MCNC: 4.4 MMOL/L — SIGNIFICANT CHANGE UP (ref 3.5–5.3)
POTASSIUM SERPL-SCNC: 4.4 MMOL/L — SIGNIFICANT CHANGE UP (ref 3.5–5.3)
PROT SERPL-MCNC: 7.4 G/DL — SIGNIFICANT CHANGE UP (ref 6–8.3)
PROT UR-MCNC: NEGATIVE MG/DL — SIGNIFICANT CHANGE UP
RBC # BLD: 3.9 M/UL — LOW (ref 4.2–5.8)
RBC # FLD: 14 % — SIGNIFICANT CHANGE UP (ref 10.3–14.5)
SALICYLATES SERPL-MCNC: 1.3 MG/DL — LOW (ref 2.8–20)
SODIUM SERPL-SCNC: 136 MMOL/L — SIGNIFICANT CHANGE UP (ref 135–145)
SP GR SPEC: 1 — LOW (ref 1.01–1.02)
UROBILINOGEN FLD QL: NEGATIVE MG/DL — SIGNIFICANT CHANGE UP
WBC # BLD: 9.07 K/UL — SIGNIFICANT CHANGE UP (ref 3.8–10.5)
WBC # FLD AUTO: 9.07 K/UL — SIGNIFICANT CHANGE UP (ref 3.8–10.5)

## 2020-01-28 PROCEDURE — 90792 PSYCH DIAG EVAL W/MED SRVCS: CPT | Mod: GT

## 2020-01-28 PROCEDURE — 99283 EMERGENCY DEPT VISIT LOW MDM: CPT

## 2020-01-28 RX ORDER — OLANZAPINE 15 MG/1
5 TABLET, FILM COATED ORAL ONCE
Refills: 0 | Status: COMPLETED | OUTPATIENT
Start: 2020-01-28 | End: 2020-01-28

## 2020-01-28 RX ORDER — SUCRALFATE 1 G
1 TABLET ORAL ONCE
Refills: 0 | Status: DISCONTINUED | OUTPATIENT
Start: 2020-01-28 | End: 2020-01-28

## 2020-01-28 RX ADMIN — OLANZAPINE 5 MILLIGRAM(S): 15 TABLET, FILM COATED ORAL at 21:48

## 2020-01-28 RX ADMIN — Medication 1 MILLIGRAM(S): at 20:34

## 2020-01-28 NOTE — ED BEHAVIORAL HEALTH NOTE - BEHAVIORAL HEALTH NOTE
============  ED COURSE   ============  SOURCE: Bedside RN, Chart    ARRIVAL: Patient arrived to the ED via walk-in by himself, per third party report patient was recommended for evaluation by his outpatient therapist Dr. Madrigal    BELONGINGS: No items of note    BEHAVIOR: Patient arrived to the ED alert and oriented x3. Patient had normal grooming and hygiene. Patient was cooperative with ED safety protocols, patient was cooperative with most medical protocols but refused EKG for unknown reasons. Patient denied mood symptoms but reported being in ED sent by his therapist for grandiose behavior and that he is a man of god. Patient had anxious and elevated affect. Patient denied SI/HI, patient did not report or exhibit symptoms of AH/VH, did not make bizarre or paranoid statements to beside RN. Patient had linear thought process, his speech was loud, rate was within normal limits. Patient remained in good behavioral control while in the ED.     TREATMENT: None prior to assessment    VISITORS: None

## 2020-01-28 NOTE — ED PROVIDER NOTE - NS ED SCRIBE STATEMENT
Patient ID: Adilene Rangel is a 63 year old female.    Chief Complaint   Patient presents with   • Hypertension     medication follow up.        History of Present Illness:  Patient is here for follow-up on hypertension.  Last visit patient was started on amlodipine/olmesartan 5/20 mg daily.  Patient has not been checking her blood pressure at home, against recommendation.  Denies any side effects the medication.  Patient states she disappointed her blood pressure has not improved further.    Patient is requesting referral for podiatrist.  Patient has problems with her feet foot pain..    Requesting referral to gastroenterologist for colon cancer screening.        Review of Systems   Constitutional: Negative for activity change, appetite change, chills, fatigue, fever and unexpected weight change.   HENT: Negative for congestion, ear discharge, postnasal drip, rhinorrhea, sinus pain, sore throat and trouble swallowing.    Eyes: Negative for pain, discharge and redness.   Respiratory: Negative for apnea, cough, choking, chest tightness, shortness of breath and wheezing.    Cardiovascular: Negative for chest pain, palpitations and leg swelling.   Gastrointestinal: Negative for abdominal distention, abdominal pain, blood in stool, constipation, diarrhea, nausea and rectal pain.   Endocrine: Negative for polydipsia, polyphagia and polyuria.   Genitourinary: Negative for difficulty urinating, frequency, hematuria and urgency.   Musculoskeletal: Negative for arthralgias.   Skin: Negative for rash.   Allergic/Immunologic: Negative for immunocompromised state.   Neurological: Negative for dizziness, tremors, seizures, syncope, speech difficulty, weakness, numbness and headaches.   Hematological: Negative for adenopathy. Does not bruise/bleed easily.   Psychiatric/Behavioral: Negative for confusion and suicidal ideas.       PAST MEDICAL HX:    Healthcare maintenance                                        Hypertension                                                   GERD (gastroesophageal reflux disease)                        Osteopenia                                                    Varicose veins of lower extremity                             Vitamin D deficiency                                          Cervical low risk human papillomavirus (HPV) D*               Fatigue                                                       Lower leg pain                                                Rib pain on left side                                         PAST SURGICAL HX:    BILATERAL OOPHORECTOMY                                          Comment: Benign Cyst    HYSTERECTOMY                                    2015          Family History   Problem Relation Age of Onset   • Coronary Artery Disease Other    • Hypertension Other    • Heart disease Mother    • Hypertension Mother    • Cancer, Esophageal Father        Social History     Socioeconomic History   • Marital status: Single     Spouse name: Not on file   • Number of children: Not on file   • Years of education: Not on file   • Highest education level: Not on file   Social Needs   • Financial resource strain: Not on file   • Food insecurity - worry: Not on file   • Food insecurity - inability: Not on file   • Transportation needs - medical: Not on file   • Transportation needs - non-medical: Not on file   Occupational History   • Not on file   Tobacco Use   • Smoking status: Former Smoker   • Smokeless tobacco: Never Used   Substance and Sexual Activity   • Alcohol use: Yes     Frequency: Never     Comment: Socially   • Drug use: No   • Sexual activity: Not on file   Other Topics Concern   • Not on file   Social History Narrative   • Not on file       ALLERGIES:   Allergen Reactions   • Penicillins RASH       Current Outpatient Medications   Medication Sig Dispense Refill   • Multiple Vitamins-Minerals (MULTIVITAMIN ADULT) Tab      • OMEPRAZOLE PO Take 20 mg by mouth.     • aspirin 81  MG tablet Take 81 mg by mouth daily.     • Amlodipine-Olmesartan 10-40 MG Tab Take 1 tablet by mouth daily. 90 tablet 1     No current facility-administered medications for this visit.        Visit Vitals  BP (!) 158/98   Pulse 76   Temp 98.6 °F (37 °C)   Resp 16   Ht 5' 6\" (1.676 m)   Wt 93.9 kg (207 lb)   BMI 33.41 kg/m²       Physical Exam   Constitutional: She is oriented to person, place, and time. She appears well-developed and well-nourished. No distress.   HENT:   Head: Normocephalic and atraumatic.   Right Ear: External ear normal.   Left Ear: External ear normal.   Nose: Nose normal.   Mouth/Throat: Oropharynx is clear and moist. No oropharyngeal exudate.   Eyes: Conjunctivae and EOM are normal. Pupils are equal, round, and reactive to light. Right eye exhibits no discharge. Left eye exhibits no discharge. No scleral icterus.   Neck: Normal range of motion. Neck supple. No JVD present. No thyromegaly present.   Cardiovascular: Normal rate, regular rhythm, normal heart sounds and intact distal pulses. Exam reveals no gallop and no friction rub.   No murmur heard.  Pulmonary/Chest: Effort normal and breath sounds normal. No respiratory distress. She has no wheezes. She has no rales. She exhibits no tenderness.   Abdominal: Soft. Bowel sounds are normal. She exhibits no distension and no mass. There is no tenderness. There is no rebound and no guarding.   Musculoskeletal: Normal range of motion. She exhibits no tenderness or deformity.   Lymphadenopathy:     She has no cervical adenopathy.   Neurological: She is alert and oriented to person, place, and time. She has normal reflexes. No cranial nerve deficit. She exhibits normal muscle tone. Coordination normal.   Skin: Skin is warm and dry. No rash noted. She is not diaphoretic. No erythema. No pallor.   Psychiatric: She has a normal mood and affect. Her behavior is normal. Judgment and thought content normal.   Vitals reviewed.      Assessment and  plan:  Benign essential hypertension  Blood pressure levels still remains relatively high.  Patient is asymptomatic.  Recommend increasing amlodipine/olmesartan 10/40 mg once daily.  Patient follow-up in 1 month for recheck of blood pressure.  Patient is encouraged to check home blood pressures at least on a daily basis.  - Amlodipine-Olmesartan 10-40 MG Tab; Take 1 tablet by mouth daily.  Dispense: 90 tablet; Refill: 1    Colon cancer screening  Referral to GI for colon cancer screening  - SERVICE TO GASTROENTEROLOGY    Bilateral foot pain  Foot pain.  Unclear etiology.  Referral to podiatrist.  - SERVICE TO PODIATRY    Follow-up 1 month  DO Debora Meza dictate was used in preparation of this document.     Attending
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
## 2020-01-28 NOTE — ED ADULT NURSE REASSESSMENT NOTE - NS ED NURSE REASSESS COMMENT FT1
patient refusing EKG. report received that continuously refusing since arrival. This RN attempted to speak to him to complete and unsuccessful. Dr. Harmon made aware. Awaiting dispo

## 2020-01-28 NOTE — ED BEHAVIORAL HEALTH ASSESSMENT NOTE - ACTIVATING EVENTS/STRESSORS
Inadequate social supports/Triggering events leading to humiliation, shame, and/or despair (e.g. Loss of relationship, financial or health status) (real or anticipated)

## 2020-01-28 NOTE — ED BEHAVIORAL HEALTH ASSESSMENT NOTE - MEDICAL ISSUES AND PLAN (INCLUDE STANDING AND PRN MEDICATION)
BPH, CAD -  continue Flomax and ASA 81mg;  + UTI - Per ED team pt is asymptomatic, medical management per ED team. BPH, CAD -  continue Flomax and ASA 81mg; +UTI noted on UA, Per ED team pt is asymptomatic and does not require medical treatment for UTI at this time. Further recs per EM provider note.

## 2020-01-28 NOTE — ED ADULT NURSE NOTE - ED STAT RN HANDOFF DETAILS
Report received from RN at this time. Assessment available on [facility]. will continue to monitor

## 2020-01-28 NOTE — ED BEHAVIORAL HEALTH ASSESSMENT NOTE - RISK ASSESSMENT
Pt is at moderately elevated risk due to manic episode, poor social support and unstable living situation. Risk is mitigated by pt denying current suicidal or homicidal ideation, no history of suicide attempts, positive therapeutic relationships and engagement in treatment. Low Acute Suicide Risk Pt is at elevated risk due to manic episode, and suspected noncompliance with meds. Is unable to care for self and requires inpt hospitalization for safety and stabilization.

## 2020-01-28 NOTE — ED BEHAVIORAL HEALTH ASSESSMENT NOTE - CASE SUMMARY
68 yo  male, single, currently domiciled in [facility], with psych hx of Schizoaffective disorder bipolar type, Alcohol Use Disorder in remission, prior IPP hospitalizations last reported in 2006, current outpatient psych tx with ALVINA Madrigal at [facility], sent by outpatient team for evaluation of grandiose behavior.    On assessment, pt is grandiose, religiously preoccupied, with rapid speech and labile mood. Based on collateral and assessment, pt was recently impulsive, making poor decisions that are uncharacteristic of him, demonstrating poor insight and judgment. Based on collateral, this is an acute change from pt's baseline. Pt appears to be having a manic episode and requires inpatient admission for safety and stabilization. Plan as above

## 2020-01-28 NOTE — ED BEHAVIORAL HEALTH ASSESSMENT NOTE - SUMMARY
70 yo  male, single, currently domiciled in [facility], with psych hx of Schizoaffective disorder bipolar type, Alcohol Use Disorder in remission, prior IPP hospitalizations last reported in 2006, current outpatient psych tx with ALVINA Madrigal at [facility] rehab adult day health program, sent by outpatient team for evaluation of grandiose behavior.    On assessment, pt is grandiose, religiously preoccupied, with rapid speech and labile mood. Based on collateral and assessment, pt was recently impulsive, making poor decisions that are uncharacteristic of him, demonstrating poor insight and judgment. Based on collateral, this is an acute change from pt's baseline. Pt appears to be having a manic episode and requires inpatient admission for safety and stabilization. +UTI noted on UA, which may be contributing to acute change in mental status. Per ED team pt is asymptomatic, medical management per ED team. 70 yo  male, single, currently domiciled in [facility], with psych hx of Schizoaffective disorder bipolar type, Alcohol Use Disorder in remission, prior IPP hospitalizations last reported in 2006, current outpatient psych tx with ALVINA Madrigal at [facility], sent by outpatient team for evaluation of grandiose behavior.    On assessment, pt is grandiose, religiously preoccupied, with rapid speech and labile mood. Based on collateral and assessment, pt was recently impulsive, making poor decisions that are uncharacteristic of him, demonstrating poor insight and judgment. Based on collateral, this is an acute change from pt's baseline. Pt appears to be having a manic episode and requires inpatient admission for safety and stabilization.

## 2020-01-28 NOTE — ED PROVIDER NOTE - OBJECTIVE STATEMENT
patient sent to ED from nursing home for agitation during therapy today. patient sent to ED from therapy session for agitation during therapy today.  patient states he was seen by his therapist Dr Madrigal today, sees the therapist twice a week,  was advised come to ED for psych evaluation for "grandiose behavior",  states he is a "man of god", reads the bible daily,  he has hx of schizophrenia diagnosed in 2006, takes zyprexa, topamax and risperdal, is currently living in a hotel.  patient denies alcohol , drug abuse, + smoker.  PMD Dr Jay Burlington

## 2020-01-28 NOTE — ED BEHAVIORAL HEALTH ASSESSMENT NOTE - DIFFERENTIAL
Schizoaffective disorder, bipolar type, mre manic vs Bipolar disorder   r/o delirium   alcohol use disorder in remission Schizoaffective disorder, bipolar type currently manic with psychotic features  alcohol use disorder in remission

## 2020-01-28 NOTE — ED PROVIDER NOTE - PROGRESS NOTE DETAILS
patient refused to be evaluated, security and RN at bedside Rob AS for Dr. Wright: 70 y/o male with a PMHx of Schizophrenia BIB Police to the ED for agitation. Pt from an adult day center, has been going there for 5-6 years. Pt states that the NP from the program suggested that the pt should be seen at a hospital to be admitted. Pt states that the NP thought the pt was acting grandiose and having delusions. Pt denies SI/HI. Denies hallucinations. Has been hospitalized in the past for psychiatric issues. Pt is on Zyprexa and Topamax. No recent ETOH use. Smoker. PE: Lungs clear. Heart normal. Vital signs normal. +pt is calm and cooperative but agitated at times. No SI/HI. No hallucinations. Impression: Schizophrenia with ?delusions. Plan is medical clearance and psych eval. spoke with Telepscyh, case discussed, results discussed, will see patient urinalysis noted to have wbc 11-25, no nitrites, few bacteria,  trace blood, moderate leuks, culture pending, no abx ordered at this time spoke with telepsych, patient needs admission, they wiil try to obtain bed at [facility] today,  recommended 2 pc,  discussed ua result, urinalysis noted to have wbc 11-25, no nitrites, few bacteria,  trace blood, moderate leuks, culture pending, and patient with no urinary symptoms, no abx ordered at this time patient refused iv  spoke with telepsych, advised ativan 1 mg, aware patient refused ekg discussed patient medications with telepsych psychiatrist, zyprexa and topamax, advised zyprexa 5 mg, no topamax, not sure if patient has been compliant with medications

## 2020-01-28 NOTE — ED BEHAVIORAL HEALTH ASSESSMENT NOTE - PSYCHIATRIC ISSUES AND PLAN (INCLUDE STANDING AND PRN MEDICATION)
Resume Zyprexa 5mg, titrate as tolerated on inpatient unit; Zyprexa 2.5mg po/IM q8 prn agitation Resume Zyprexa 5mg QHS, titrate as tolerated on inpatient unit; Zyprexa 2.5mg po/IM q8 prn agitation

## 2020-01-28 NOTE — ED BEHAVIORAL HEALTH ASSESSMENT NOTE - DESCRIPTION
see HPI see BH note never , no children, pt has an aunt who is his HCP. 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

## 2020-01-28 NOTE — ED ADULT NURSE NOTE - OBJECTIVE STATEMENT
[facility], after became frustrated and agitated and sent in for psych evaluation. denies SI/HI but noted paranoid behavior and appears anxious, belongings and valuables located the safe and cabinet, denies any physical pain or complaints, warms skin, will continue to monitor.

## 2020-01-28 NOTE — ED BEHAVIORAL HEALTH ASSESSMENT NOTE - AXIS IV
Economic problems/Problems with primary support/Housing problems/Problem related to social environment

## 2020-01-29 ENCOUNTER — INPATIENT (INPATIENT)
Facility: HOSPITAL | Age: 70
LOS: 26 days | Discharge: HOME | End: 2020-02-25
Attending: PSYCHIATRY & NEUROLOGY | Admitting: PSYCHIATRY & NEUROLOGY
Payer: MEDICARE

## 2020-01-29 VITALS — WEIGHT: 240.08 LBS | HEIGHT: 66 IN

## 2020-01-29 DIAGNOSIS — Z98.890 OTHER SPECIFIED POSTPROCEDURAL STATES: Chronic | ICD-10-CM

## 2020-01-29 DIAGNOSIS — F31.9 BIPOLAR DISORDER, UNSPECIFIED: ICD-10-CM

## 2020-01-29 DIAGNOSIS — J34.2 DEVIATED NASAL SEPTUM: Chronic | ICD-10-CM

## 2020-01-29 LAB — GLUCOSE BLDC GLUCOMTR-MCNC: 106 MG/DL — HIGH (ref 70–99)

## 2020-01-29 PROCEDURE — 99232 SBSQ HOSP IP/OBS MODERATE 35: CPT

## 2020-01-29 PROCEDURE — 93010 ELECTROCARDIOGRAM REPORT: CPT

## 2020-01-29 PROCEDURE — 81001 URINALYSIS AUTO W/SCOPE: CPT

## 2020-01-29 PROCEDURE — 36415 COLL VENOUS BLD VENIPUNCTURE: CPT

## 2020-01-29 PROCEDURE — 93005 ELECTROCARDIOGRAM TRACING: CPT

## 2020-01-29 PROCEDURE — 85027 COMPLETE CBC AUTOMATED: CPT

## 2020-01-29 PROCEDURE — 99233 SBSQ HOSP IP/OBS HIGH 50: CPT

## 2020-01-29 PROCEDURE — 80307 DRUG TEST PRSMV CHEM ANLYZR: CPT

## 2020-01-29 PROCEDURE — 99285 EMERGENCY DEPT VISIT HI MDM: CPT | Mod: 25

## 2020-01-29 PROCEDURE — 80053 COMPREHEN METABOLIC PANEL: CPT

## 2020-01-29 RX ORDER — OLANZAPINE 15 MG/1
5 TABLET, FILM COATED ORAL AT BEDTIME
Refills: 0 | Status: DISCONTINUED | OUTPATIENT
Start: 2020-01-29 | End: 2020-01-31

## 2020-01-29 RX ORDER — TAMSULOSIN HYDROCHLORIDE 0.4 MG/1
0.4 CAPSULE ORAL AT BEDTIME
Refills: 0 | Status: DISCONTINUED | OUTPATIENT
Start: 2020-01-29 | End: 2020-02-25

## 2020-01-29 RX ORDER — OLANZAPINE 15 MG/1
2.5 TABLET, FILM COATED ORAL EVERY 8 HOURS
Refills: 0 | Status: DISCONTINUED | OUTPATIENT
Start: 2020-01-29 | End: 2020-02-25

## 2020-01-29 RX ORDER — ASPIRIN/CALCIUM CARB/MAGNESIUM 324 MG
81 TABLET ORAL DAILY
Refills: 0 | Status: DISCONTINUED | OUTPATIENT
Start: 2020-01-29 | End: 2020-01-31

## 2020-01-29 RX ORDER — OLANZAPINE 15 MG/1
2.5 TABLET, FILM COATED ORAL ONCE
Refills: 0 | Status: DISCONTINUED | OUTPATIENT
Start: 2020-01-29 | End: 2020-02-25

## 2020-01-29 RX ORDER — OLANZAPINE 15 MG/1
5 TABLET, FILM COATED ORAL ONCE
Refills: 0 | Status: COMPLETED | OUTPATIENT
Start: 2020-01-29 | End: 2020-01-29

## 2020-01-29 RX ADMIN — OLANZAPINE 5 MILLIGRAM(S): 15 TABLET, FILM COATED ORAL at 20:26

## 2020-01-29 RX ADMIN — OLANZAPINE 5 MILLIGRAM(S): 15 TABLET, FILM COATED ORAL at 00:16

## 2020-01-29 RX ADMIN — Medication 100 MILLIGRAM(S): at 23:00

## 2020-01-29 RX ADMIN — TAMSULOSIN HYDROCHLORIDE 0.4 MILLIGRAM(S): 0.4 CAPSULE ORAL at 20:26

## 2020-01-29 RX ADMIN — Medication 81 MILLIGRAM(S): at 09:25

## 2020-01-29 RX ADMIN — Medication 2 MILLIGRAM(S): at 00:16

## 2020-01-29 NOTE — PROGRESS NOTE ADULT - ASSESSMENT
70 yo M w/ extensive smoking history (~56 pack years), schizoaffective disorder, CAD, BPH who is admitted to [facility] for further psychiatric management. Respiratory status currently stable on room air.     # Heavy smoker (current)  - no respiratory distress at this time, O2 sat is satisfactory on room air, denies SOB, states cough is at baseline, no evidence to suggest infection/PNA (afebrile, without leukocytosis, denies infectious symptoms)  - no wheeze appreciated on exam  - counseled cessation (not interested, declined nicotine replacement therapy at this time).   - given age and extensive smoking history, USPTF recommends screening for lung cancer with low dose CT chest, and screening for AAA with U/S. Should discuss with PMD to see if done previously/arrange outpatient followup.     # CAD  - denies active chest pain  - c/w asa  - obtain collateral to see if any reason patient is not on statin    # BPH  - c/w flomax, currently denies urinary symptoms    # Schizoaffective disorder  - safety precautions and management as per psych

## 2020-01-29 NOTE — ED BEHAVIORAL HEALTH NOTE - BEHAVIORAL HEALTH NOTE
Telepsychiatry Reassessment:    70 yo  male, single, currently domiciled in [facility], with psych hx of Schizoaffective disorder bipolar type, Alcohol Use Disorder in remission, prior IPP hospitalizations last reported in 2006, current outpatient psych tx with ALVINA Madrigal at [facility] rehab adult day health program, sent by outpatient team for evaluation of grandiose behavior.    Interval Hx:  Per ED staff, pt has been refusing EKG.    Writer reassessed pt around midnight.  He reports that he is unwilling to have EKG done and is unable to provide rationale.  Pt states his outpatient provider also wanted him to have an EKG but he declined.  Pt reports compliance with zyprexa 10 mg daily / 20 mg qhs.  Pt has no specific complaints at this time.  Pt became irritated and requested that writer leave him alone since he was never going to agree to EKG.      MSE:  appearance: poorly groomed  eye contact: intermittent  speech: normal rate, rhythm, volume  thought process: illogical  thought content: no expressed delusions  perceptions: denies AVH, does not appear internally preoccupied  mood: fine  affect: irritable, constricted  insight: poor  judgment: poor    Plan:  -Continue to hold in ED.    -EKG needed before psych admission.  -Can give additional zyprexa 5 mg PO and ativan 2 mg PO. Telepsychiatry Reassessment:    Interval Hx:  Per ED staff, pt has been refusing EKG.    Writer reassessed pt around midnight.  He reports that he is unwilling to have EKG done and is unable to provide rationale.  Pt states his outpatient provider also wanted him to have an EKG but he declined.  Pt reports compliance with zyprexa 10 mg daily / 20 mg qhs.  Pt has no specific complaints at this time.  Pt became irritated and requested that writer leave him alone since he was never going to agree to EKG.      MSE:  appearance: poorly groomed  eye contact: intermittent  speech: normal rate, rhythm, volume  thought process: illogical  thought content: no expressed delusions  perceptions: denies AVH, does not appear internally preoccupied  mood: fine  affect: irritable, constricted  insight: poor  judgment: poor    Assessment:  70 yo  male, single, currently domiciled in [facility], with psych hx of Schizoaffective disorder bipolar type, Alcohol Use Disorder in remission, prior IPP hospitalizations last reported in 2006, current outpatient psych tx with ALVINA Madrigal at [facility] rehab adult day health program, sent by outpatient team for evaluation of grandiose behavior.  Pt requires inpatient admission for safety and stabilization given symptoms of zoe with poor insight and judgment.    Plan:  -Continue to hold in ED.    -EKG needed before psych admission.  -Can give additional zyprexa 5 mg PO and ativan 2 mg PO.

## 2020-01-29 NOTE — ED ADULT NURSE REASSESSMENT NOTE - NS ED NURSE REASSESS COMMENT FT1
EKG completed at this time. Pt resting comfortably at this time. No s/s of pain noted. Awaiting TP instructions for dispo.

## 2020-01-29 NOTE — ED ADULT NURSE REASSESSMENT NOTE - NS ED NURSE REASSESS COMMENT FT1
Enedina with Kerry from . Patient [facility] on unit [address] #[phone] accepting MD Dr. Osullivan

## 2020-01-30 PROBLEM — J44.9 CHRONIC OBSTRUCTIVE PULMONARY DISEASE, UNSPECIFIED: Chronic | Status: ACTIVE | Noted: 2018-03-21

## 2020-01-30 PROBLEM — K21.9 GASTRO-ESOPHAGEAL REFLUX DISEASE WITHOUT ESOPHAGITIS: Chronic | Status: ACTIVE | Noted: 2018-03-21

## 2020-01-30 PROBLEM — I25.10 ATHEROSCLEROTIC HEART DISEASE OF NATIVE CORONARY ARTERY WITHOUT ANGINA PECTORIS: Chronic | Status: ACTIVE | Noted: 2018-03-21

## 2020-01-30 PROBLEM — N40.0 BENIGN PROSTATIC HYPERPLASIA WITHOUT LOWER URINARY TRACT SYMPTOMS: Chronic | Status: ACTIVE | Noted: 2018-03-21

## 2020-01-30 PROBLEM — R42 DIZZINESS AND GIDDINESS: Chronic | Status: ACTIVE | Noted: 2018-03-21

## 2020-01-30 PROBLEM — F20.9 SCHIZOPHRENIA, UNSPECIFIED: Chronic | Status: ACTIVE | Noted: 2018-03-21

## 2020-01-30 PROBLEM — K59.00 CONSTIPATION, UNSPECIFIED: Chronic | Status: ACTIVE | Noted: 2018-03-21

## 2020-01-30 PROBLEM — K40.90 UNILATERAL INGUINAL HERNIA, WITHOUT OBSTRUCTION OR GANGRENE, NOT SPECIFIED AS RECURRENT: Chronic | Status: ACTIVE | Noted: 2018-03-21

## 2020-01-30 PROBLEM — J96.90 RESPIRATORY FAILURE, UNSPECIFIED, UNSPECIFIED WHETHER WITH HYPOXIA OR HYPERCAPNIA: Chronic | Status: ACTIVE | Noted: 2018-03-21

## 2020-01-30 PROCEDURE — 99232 SBSQ HOSP IP/OBS MODERATE 35: CPT

## 2020-01-30 RX ORDER — TOPIRAMATE 25 MG
50 TABLET ORAL DAILY
Refills: 0 | Status: DISCONTINUED | OUTPATIENT
Start: 2020-01-30 | End: 2020-02-25

## 2020-01-30 RX ORDER — TOPIRAMATE 25 MG
100 TABLET ORAL AT BEDTIME
Refills: 0 | Status: DISCONTINUED | OUTPATIENT
Start: 2020-01-30 | End: 2020-02-25

## 2020-01-30 RX ORDER — SENNA PLUS 8.6 MG/1
2 TABLET ORAL AT BEDTIME
Refills: 0 | Status: DISCONTINUED | OUTPATIENT
Start: 2020-01-30 | End: 2020-02-25

## 2020-01-30 RX ORDER — RISPERIDONE 4 MG/1
1 TABLET ORAL AT BEDTIME
Refills: 0 | Status: DISCONTINUED | OUTPATIENT
Start: 2020-01-30 | End: 2020-02-06

## 2020-01-30 RX ADMIN — OLANZAPINE 5 MILLIGRAM(S): 15 TABLET, FILM COATED ORAL at 20:45

## 2020-01-30 RX ADMIN — TAMSULOSIN HYDROCHLORIDE 0.4 MILLIGRAM(S): 0.4 CAPSULE ORAL at 20:45

## 2020-01-30 RX ADMIN — Medication 81 MILLIGRAM(S): at 08:40

## 2020-01-30 RX ADMIN — RISPERIDONE 1 MILLIGRAM(S): 4 TABLET ORAL at 20:45

## 2020-01-30 RX ADMIN — Medication 1 TABLET(S): at 09:16

## 2020-01-30 RX ADMIN — Medication 100 MILLIGRAM(S): at 20:45

## 2020-01-30 RX ADMIN — Medication 50 MILLIGRAM(S): at 09:16

## 2020-01-31 PROCEDURE — 99232 SBSQ HOSP IP/OBS MODERATE 35: CPT

## 2020-01-31 RX ORDER — OLANZAPINE 15 MG/1
10 TABLET, FILM COATED ORAL AT BEDTIME
Refills: 0 | Status: DISCONTINUED | OUTPATIENT
Start: 2020-01-31 | End: 2020-02-02

## 2020-01-31 RX ORDER — ASPIRIN/CALCIUM CARB/MAGNESIUM 324 MG
162 TABLET ORAL DAILY
Refills: 0 | Status: DISCONTINUED | OUTPATIENT
Start: 2020-02-01 | End: 2020-02-25

## 2020-01-31 RX ORDER — NITROGLYCERIN 6.5 MG
0.4 CAPSULE, EXTENDED RELEASE ORAL
Refills: 0 | Status: DISCONTINUED | OUTPATIENT
Start: 2020-01-31 | End: 2020-02-25

## 2020-01-31 RX ADMIN — OLANZAPINE 10 MILLIGRAM(S): 15 TABLET, FILM COATED ORAL at 20:32

## 2020-01-31 RX ADMIN — Medication 1 TABLET(S): at 09:22

## 2020-01-31 RX ADMIN — Medication 81 MILLIGRAM(S): at 09:22

## 2020-01-31 RX ADMIN — TAMSULOSIN HYDROCHLORIDE 0.4 MILLIGRAM(S): 0.4 CAPSULE ORAL at 20:32

## 2020-01-31 RX ADMIN — Medication 50 MILLIGRAM(S): at 09:22

## 2020-01-31 RX ADMIN — Medication 100 MILLIGRAM(S): at 20:32

## 2020-01-31 RX ADMIN — RISPERIDONE 1 MILLIGRAM(S): 4 TABLET ORAL at 20:32

## 2020-01-31 RX ADMIN — Medication 100 MILLIGRAM(S): at 08:11

## 2020-02-01 ENCOUNTER — OUTPATIENT (OUTPATIENT)
Dept: OUTPATIENT SERVICES | Facility: HOSPITAL | Age: 70
LOS: 1 days | End: 2020-02-01
Payer: MEDICARE

## 2020-02-01 DIAGNOSIS — Z98.890 OTHER SPECIFIED POSTPROCEDURAL STATES: Chronic | ICD-10-CM

## 2020-02-01 DIAGNOSIS — J34.2 DEVIATED NASAL SEPTUM: Chronic | ICD-10-CM

## 2020-02-01 PROCEDURE — G9001: CPT

## 2020-02-01 PROCEDURE — 99232 SBSQ HOSP IP/OBS MODERATE 35: CPT

## 2020-02-01 RX ORDER — MAGNESIUM HYDROXIDE 400 MG/1
30 TABLET, CHEWABLE ORAL DAILY
Refills: 0 | Status: DISCONTINUED | OUTPATIENT
Start: 2020-02-01 | End: 2020-02-25

## 2020-02-01 RX ADMIN — Medication 50 MILLIGRAM(S): at 08:59

## 2020-02-01 RX ADMIN — TAMSULOSIN HYDROCHLORIDE 0.4 MILLIGRAM(S): 0.4 CAPSULE ORAL at 21:04

## 2020-02-01 RX ADMIN — Medication 1 TABLET(S): at 08:59

## 2020-02-01 RX ADMIN — RISPERIDONE 1 MILLIGRAM(S): 4 TABLET ORAL at 21:04

## 2020-02-01 RX ADMIN — Medication 162 MILLIGRAM(S): at 08:59

## 2020-02-01 RX ADMIN — MAGNESIUM HYDROXIDE 30 MILLILITER(S): 400 TABLET, CHEWABLE ORAL at 21:19

## 2020-02-01 RX ADMIN — OLANZAPINE 10 MILLIGRAM(S): 15 TABLET, FILM COATED ORAL at 21:04

## 2020-02-01 RX ADMIN — Medication 100 MILLIGRAM(S): at 21:04

## 2020-02-02 LAB
CHOLEST SERPL-MCNC: 186 MG/DL — SIGNIFICANT CHANGE UP (ref 120–199)
HBA1C BLD-MCNC: 5.2 % — SIGNIFICANT CHANGE UP (ref 4–5.6)
HDLC SERPL-MCNC: 58 MG/DL — HIGH (ref 35–55)
LIPID PNL WITH DIRECT LDL SERPL: 120 MG/DL — SIGNIFICANT CHANGE UP
TRIGL SERPL-MCNC: 49 MG/DL — SIGNIFICANT CHANGE UP (ref 10–149)
TSH SERPL-MCNC: 3.61 UIU/ML — SIGNIFICANT CHANGE UP (ref 0.27–4.2)
VIT B12 SERPL-MCNC: 1148 PG/ML — HIGH (ref 200–900)

## 2020-02-02 PROCEDURE — 99232 SBSQ HOSP IP/OBS MODERATE 35: CPT

## 2020-02-02 RX ORDER — OLANZAPINE 15 MG/1
15 TABLET, FILM COATED ORAL AT BEDTIME
Refills: 0 | Status: DISCONTINUED | OUTPATIENT
Start: 2020-02-02 | End: 2020-02-04

## 2020-02-02 RX ADMIN — Medication 1 TABLET(S): at 09:16

## 2020-02-02 RX ADMIN — Medication 100 MILLIGRAM(S): at 07:34

## 2020-02-02 RX ADMIN — Medication 100 MILLIGRAM(S): at 20:33

## 2020-02-02 RX ADMIN — TAMSULOSIN HYDROCHLORIDE 0.4 MILLIGRAM(S): 0.4 CAPSULE ORAL at 20:33

## 2020-02-02 RX ADMIN — Medication 50 MILLIGRAM(S): at 09:16

## 2020-02-02 RX ADMIN — Medication 162 MILLIGRAM(S): at 09:15

## 2020-02-02 RX ADMIN — OLANZAPINE 15 MILLIGRAM(S): 15 TABLET, FILM COATED ORAL at 20:33

## 2020-02-02 RX ADMIN — RISPERIDONE 1 MILLIGRAM(S): 4 TABLET ORAL at 20:33

## 2020-02-03 PROCEDURE — 90832 PSYTX W PT 30 MINUTES: CPT

## 2020-02-03 PROCEDURE — 99232 SBSQ HOSP IP/OBS MODERATE 35: CPT

## 2020-02-03 RX ADMIN — RISPERIDONE 1 MILLIGRAM(S): 4 TABLET ORAL at 20:43

## 2020-02-03 RX ADMIN — OLANZAPINE 15 MILLIGRAM(S): 15 TABLET, FILM COATED ORAL at 20:43

## 2020-02-03 RX ADMIN — Medication 162 MILLIGRAM(S): at 08:52

## 2020-02-03 RX ADMIN — TAMSULOSIN HYDROCHLORIDE 0.4 MILLIGRAM(S): 0.4 CAPSULE ORAL at 20:43

## 2020-02-03 RX ADMIN — Medication 1 TABLET(S): at 08:52

## 2020-02-03 RX ADMIN — Medication 50 MILLIGRAM(S): at 08:52

## 2020-02-03 RX ADMIN — Medication 100 MILLIGRAM(S): at 20:43

## 2020-02-04 PROCEDURE — 99232 SBSQ HOSP IP/OBS MODERATE 35: CPT | Mod: GC

## 2020-02-04 RX ORDER — OLANZAPINE 15 MG/1
20 TABLET, FILM COATED ORAL AT BEDTIME
Refills: 0 | Status: DISCONTINUED | OUTPATIENT
Start: 2020-02-04 | End: 2020-02-11

## 2020-02-04 RX ADMIN — RISPERIDONE 1 MILLIGRAM(S): 4 TABLET ORAL at 20:50

## 2020-02-04 RX ADMIN — Medication 162 MILLIGRAM(S): at 08:45

## 2020-02-04 RX ADMIN — Medication 100 MILLIGRAM(S): at 20:50

## 2020-02-04 RX ADMIN — TAMSULOSIN HYDROCHLORIDE 0.4 MILLIGRAM(S): 0.4 CAPSULE ORAL at 20:50

## 2020-02-04 RX ADMIN — Medication 1 TABLET(S): at 08:45

## 2020-02-04 RX ADMIN — Medication 50 MILLIGRAM(S): at 08:45

## 2020-02-04 RX ADMIN — OLANZAPINE 20 MILLIGRAM(S): 15 TABLET, FILM COATED ORAL at 20:50

## 2020-02-05 PROCEDURE — 99232 SBSQ HOSP IP/OBS MODERATE 35: CPT | Mod: GC

## 2020-02-05 PROCEDURE — 90853 GROUP PSYCHOTHERAPY: CPT

## 2020-02-05 RX ADMIN — RISPERIDONE 1 MILLIGRAM(S): 4 TABLET ORAL at 20:57

## 2020-02-05 RX ADMIN — Medication 50 MILLIGRAM(S): at 09:04

## 2020-02-05 RX ADMIN — Medication 100 MILLIGRAM(S): at 20:57

## 2020-02-05 RX ADMIN — TAMSULOSIN HYDROCHLORIDE 0.4 MILLIGRAM(S): 0.4 CAPSULE ORAL at 20:57

## 2020-02-05 RX ADMIN — OLANZAPINE 20 MILLIGRAM(S): 15 TABLET, FILM COATED ORAL at 20:57

## 2020-02-05 RX ADMIN — Medication 162 MILLIGRAM(S): at 09:04

## 2020-02-05 RX ADMIN — Medication 1 TABLET(S): at 09:04

## 2020-02-06 PROCEDURE — 99232 SBSQ HOSP IP/OBS MODERATE 35: CPT

## 2020-02-06 RX ORDER — ASCORBIC ACID 60 MG
500 TABLET,CHEWABLE ORAL DAILY
Refills: 0 | Status: DISCONTINUED | OUTPATIENT
Start: 2020-02-06 | End: 2020-02-10

## 2020-02-06 RX ORDER — RISPERIDONE 4 MG/1
2 TABLET ORAL AT BEDTIME
Refills: 0 | Status: DISCONTINUED | OUTPATIENT
Start: 2020-02-06 | End: 2020-02-10

## 2020-02-06 RX ADMIN — OLANZAPINE 20 MILLIGRAM(S): 15 TABLET, FILM COATED ORAL at 20:51

## 2020-02-06 RX ADMIN — Medication 1 TABLET(S): at 08:53

## 2020-02-06 RX ADMIN — Medication 162 MILLIGRAM(S): at 08:53

## 2020-02-06 RX ADMIN — Medication 100 MILLIGRAM(S): at 20:52

## 2020-02-06 RX ADMIN — MAGNESIUM HYDROXIDE 30 MILLILITER(S): 400 TABLET, CHEWABLE ORAL at 22:52

## 2020-02-06 RX ADMIN — TAMSULOSIN HYDROCHLORIDE 0.4 MILLIGRAM(S): 0.4 CAPSULE ORAL at 20:54

## 2020-02-06 RX ADMIN — Medication 50 MILLIGRAM(S): at 08:53

## 2020-02-06 RX ADMIN — RISPERIDONE 2 MILLIGRAM(S): 4 TABLET ORAL at 20:51

## 2020-02-07 RX ADMIN — Medication 50 MILLIGRAM(S): at 09:20

## 2020-02-07 RX ADMIN — OLANZAPINE 20 MILLIGRAM(S): 15 TABLET, FILM COATED ORAL at 20:51

## 2020-02-07 RX ADMIN — Medication 1 TABLET(S): at 09:20

## 2020-02-07 RX ADMIN — RISPERIDONE 2 MILLIGRAM(S): 4 TABLET ORAL at 20:51

## 2020-02-07 RX ADMIN — Medication 162 MILLIGRAM(S): at 09:20

## 2020-02-07 RX ADMIN — Medication 500 MILLIGRAM(S): at 09:20

## 2020-02-07 RX ADMIN — TAMSULOSIN HYDROCHLORIDE 0.4 MILLIGRAM(S): 0.4 CAPSULE ORAL at 20:51

## 2020-02-07 RX ADMIN — Medication 100 MILLIGRAM(S): at 20:52

## 2020-02-08 RX ADMIN — Medication 50 MILLIGRAM(S): at 09:07

## 2020-02-08 RX ADMIN — Medication 162 MILLIGRAM(S): at 09:07

## 2020-02-08 RX ADMIN — OLANZAPINE 20 MILLIGRAM(S): 15 TABLET, FILM COATED ORAL at 20:55

## 2020-02-08 RX ADMIN — Medication 100 MILLIGRAM(S): at 20:56

## 2020-02-08 RX ADMIN — TAMSULOSIN HYDROCHLORIDE 0.4 MILLIGRAM(S): 0.4 CAPSULE ORAL at 20:56

## 2020-02-08 RX ADMIN — Medication 500 MILLIGRAM(S): at 09:07

## 2020-02-08 RX ADMIN — Medication 1 TABLET(S): at 09:07

## 2020-02-08 RX ADMIN — RISPERIDONE 2 MILLIGRAM(S): 4 TABLET ORAL at 20:55

## 2020-02-09 RX ADMIN — TAMSULOSIN HYDROCHLORIDE 0.4 MILLIGRAM(S): 0.4 CAPSULE ORAL at 20:48

## 2020-02-09 RX ADMIN — RISPERIDONE 2 MILLIGRAM(S): 4 TABLET ORAL at 20:48

## 2020-02-09 RX ADMIN — OLANZAPINE 20 MILLIGRAM(S): 15 TABLET, FILM COATED ORAL at 20:48

## 2020-02-09 RX ADMIN — Medication 162 MILLIGRAM(S): at 09:12

## 2020-02-09 RX ADMIN — Medication 100 MILLIGRAM(S): at 20:48

## 2020-02-09 RX ADMIN — Medication 50 MILLIGRAM(S): at 09:12

## 2020-02-10 PROCEDURE — 90853 GROUP PSYCHOTHERAPY: CPT

## 2020-02-10 PROCEDURE — 99232 SBSQ HOSP IP/OBS MODERATE 35: CPT | Mod: GC

## 2020-02-10 RX ORDER — RISPERIDONE 4 MG/1
3 TABLET ORAL AT BEDTIME
Refills: 0 | Status: DISCONTINUED | OUTPATIENT
Start: 2020-02-10 | End: 2020-02-11

## 2020-02-10 RX ADMIN — Medication 1 TABLET(S): at 09:21

## 2020-02-10 RX ADMIN — Medication 162 MILLIGRAM(S): at 09:21

## 2020-02-10 RX ADMIN — Medication 50 MILLIGRAM(S): at 09:21

## 2020-02-10 RX ADMIN — Medication 500 MILLIGRAM(S): at 09:21

## 2020-02-10 RX ADMIN — MAGNESIUM HYDROXIDE 30 MILLILITER(S): 400 TABLET, CHEWABLE ORAL at 23:11

## 2020-02-10 RX ADMIN — Medication 100 MILLIGRAM(S): at 20:42

## 2020-02-10 RX ADMIN — Medication 30 MILLILITER(S): at 09:00

## 2020-02-10 RX ADMIN — TAMSULOSIN HYDROCHLORIDE 0.4 MILLIGRAM(S): 0.4 CAPSULE ORAL at 20:42

## 2020-02-10 RX ADMIN — OLANZAPINE 20 MILLIGRAM(S): 15 TABLET, FILM COATED ORAL at 20:42

## 2020-02-10 RX ADMIN — RISPERIDONE 3 MILLIGRAM(S): 4 TABLET ORAL at 20:42

## 2020-02-11 DIAGNOSIS — Z71.89 OTHER SPECIFIED COUNSELING: ICD-10-CM

## 2020-02-11 PROBLEM — F20.9 SCHIZOPHRENIA, UNSPECIFIED: Chronic | Status: ACTIVE | Noted: 2020-01-28

## 2020-02-11 PROCEDURE — 99232 SBSQ HOSP IP/OBS MODERATE 35: CPT | Mod: GC

## 2020-02-11 RX ORDER — RISPERIDONE 4 MG/1
2 TABLET ORAL AT BEDTIME
Refills: 0 | Status: DISCONTINUED | OUTPATIENT
Start: 2020-02-11 | End: 2020-02-25

## 2020-02-11 RX ORDER — OLANZAPINE 15 MG/1
25 TABLET, FILM COATED ORAL AT BEDTIME
Refills: 0 | Status: DISCONTINUED | OUTPATIENT
Start: 2020-02-11 | End: 2020-02-14

## 2020-02-11 RX ADMIN — Medication 1 TABLET(S): at 09:05

## 2020-02-11 RX ADMIN — RISPERIDONE 2 MILLIGRAM(S): 4 TABLET ORAL at 20:21

## 2020-02-11 RX ADMIN — Medication 162 MILLIGRAM(S): at 09:05

## 2020-02-11 RX ADMIN — Medication 100 MILLIGRAM(S): at 20:21

## 2020-02-11 RX ADMIN — MAGNESIUM HYDROXIDE 30 MILLILITER(S): 400 TABLET, CHEWABLE ORAL at 20:41

## 2020-02-11 RX ADMIN — TAMSULOSIN HYDROCHLORIDE 0.4 MILLIGRAM(S): 0.4 CAPSULE ORAL at 20:21

## 2020-02-11 RX ADMIN — Medication 50 MILLIGRAM(S): at 09:05

## 2020-02-11 RX ADMIN — OLANZAPINE 25 MILLIGRAM(S): 15 TABLET, FILM COATED ORAL at 20:21

## 2020-02-12 PROCEDURE — 99232 SBSQ HOSP IP/OBS MODERATE 35: CPT | Mod: GC

## 2020-02-12 RX ADMIN — Medication 1 TABLET(S): at 09:13

## 2020-02-12 RX ADMIN — Medication 1 TABLET(S): at 09:12

## 2020-02-12 RX ADMIN — RISPERIDONE 2 MILLIGRAM(S): 4 TABLET ORAL at 20:27

## 2020-02-12 RX ADMIN — OLANZAPINE 25 MILLIGRAM(S): 15 TABLET, FILM COATED ORAL at 20:27

## 2020-02-12 RX ADMIN — Medication 162 MILLIGRAM(S): at 09:13

## 2020-02-12 RX ADMIN — TAMSULOSIN HYDROCHLORIDE 0.4 MILLIGRAM(S): 0.4 CAPSULE ORAL at 20:28

## 2020-02-12 RX ADMIN — Medication 100 MILLIGRAM(S): at 20:28

## 2020-02-12 RX ADMIN — Medication 50 MILLIGRAM(S): at 09:13

## 2020-02-13 PROCEDURE — 99232 SBSQ HOSP IP/OBS MODERATE 35: CPT | Mod: GC

## 2020-02-13 RX ADMIN — Medication 1 TABLET(S): at 09:21

## 2020-02-13 RX ADMIN — OLANZAPINE 25 MILLIGRAM(S): 15 TABLET, FILM COATED ORAL at 20:29

## 2020-02-13 RX ADMIN — TAMSULOSIN HYDROCHLORIDE 0.4 MILLIGRAM(S): 0.4 CAPSULE ORAL at 20:29

## 2020-02-13 RX ADMIN — Medication 162 MILLIGRAM(S): at 09:21

## 2020-02-13 RX ADMIN — Medication 100 MILLIGRAM(S): at 20:29

## 2020-02-13 RX ADMIN — Medication 50 MILLIGRAM(S): at 09:21

## 2020-02-13 RX ADMIN — RISPERIDONE 2 MILLIGRAM(S): 4 TABLET ORAL at 20:29

## 2020-02-14 PROCEDURE — 99232 SBSQ HOSP IP/OBS MODERATE 35: CPT | Mod: GC

## 2020-02-14 RX ORDER — OLANZAPINE 15 MG/1
30 TABLET, FILM COATED ORAL AT BEDTIME
Refills: 0 | Status: DISCONTINUED | OUTPATIENT
Start: 2020-02-14 | End: 2020-02-25

## 2020-02-14 RX ADMIN — Medication 100 MILLIGRAM(S): at 20:22

## 2020-02-14 RX ADMIN — Medication 1 TABLET(S): at 09:21

## 2020-02-14 RX ADMIN — TAMSULOSIN HYDROCHLORIDE 0.4 MILLIGRAM(S): 0.4 CAPSULE ORAL at 20:22

## 2020-02-14 RX ADMIN — Medication 50 MILLIGRAM(S): at 09:21

## 2020-02-14 RX ADMIN — Medication 162 MILLIGRAM(S): at 09:21

## 2020-02-14 RX ADMIN — OLANZAPINE 30 MILLIGRAM(S): 15 TABLET, FILM COATED ORAL at 20:21

## 2020-02-14 RX ADMIN — RISPERIDONE 2 MILLIGRAM(S): 4 TABLET ORAL at 20:21

## 2020-02-15 RX ADMIN — Medication 30 MILLILITER(S): at 22:14

## 2020-02-15 RX ADMIN — Medication 162 MILLIGRAM(S): at 10:05

## 2020-02-15 RX ADMIN — Medication 50 MILLIGRAM(S): at 10:05

## 2020-02-15 RX ADMIN — OLANZAPINE 30 MILLIGRAM(S): 15 TABLET, FILM COATED ORAL at 20:42

## 2020-02-15 RX ADMIN — Medication 100 MILLIGRAM(S): at 20:42

## 2020-02-15 RX ADMIN — TAMSULOSIN HYDROCHLORIDE 0.4 MILLIGRAM(S): 0.4 CAPSULE ORAL at 20:42

## 2020-02-15 RX ADMIN — RISPERIDONE 2 MILLIGRAM(S): 4 TABLET ORAL at 20:42

## 2020-02-16 RX ADMIN — Medication 1 TABLET(S): at 09:47

## 2020-02-16 RX ADMIN — RISPERIDONE 2 MILLIGRAM(S): 4 TABLET ORAL at 20:43

## 2020-02-16 RX ADMIN — Medication 50 MILLIGRAM(S): at 09:47

## 2020-02-16 RX ADMIN — TAMSULOSIN HYDROCHLORIDE 0.4 MILLIGRAM(S): 0.4 CAPSULE ORAL at 20:44

## 2020-02-16 RX ADMIN — Medication 162 MILLIGRAM(S): at 09:47

## 2020-02-16 RX ADMIN — OLANZAPINE 30 MILLIGRAM(S): 15 TABLET, FILM COATED ORAL at 20:43

## 2020-02-16 RX ADMIN — Medication 100 MILLIGRAM(S): at 20:44

## 2020-02-17 RX ADMIN — Medication 1 TABLET(S): at 08:51

## 2020-02-17 RX ADMIN — OLANZAPINE 30 MILLIGRAM(S): 15 TABLET, FILM COATED ORAL at 20:15

## 2020-02-17 RX ADMIN — MAGNESIUM HYDROXIDE 30 MILLILITER(S): 400 TABLET, CHEWABLE ORAL at 22:03

## 2020-02-17 RX ADMIN — Medication 50 MILLIGRAM(S): at 08:51

## 2020-02-17 RX ADMIN — RISPERIDONE 2 MILLIGRAM(S): 4 TABLET ORAL at 20:15

## 2020-02-17 RX ADMIN — Medication 162 MILLIGRAM(S): at 08:51

## 2020-02-17 RX ADMIN — TAMSULOSIN HYDROCHLORIDE 0.4 MILLIGRAM(S): 0.4 CAPSULE ORAL at 20:15

## 2020-02-17 RX ADMIN — Medication 100 MILLIGRAM(S): at 20:15

## 2020-02-18 PROCEDURE — 99232 SBSQ HOSP IP/OBS MODERATE 35: CPT | Mod: GC

## 2020-02-18 RX ADMIN — Medication 1 TABLET(S): at 09:19

## 2020-02-18 RX ADMIN — Medication 1 TABLET(S): at 09:16

## 2020-02-18 RX ADMIN — Medication 50 MILLIGRAM(S): at 09:17

## 2020-02-18 RX ADMIN — OLANZAPINE 30 MILLIGRAM(S): 15 TABLET, FILM COATED ORAL at 20:24

## 2020-02-18 RX ADMIN — Medication 100 MILLIGRAM(S): at 20:24

## 2020-02-18 RX ADMIN — Medication 162 MILLIGRAM(S): at 09:16

## 2020-02-18 RX ADMIN — RISPERIDONE 2 MILLIGRAM(S): 4 TABLET ORAL at 20:24

## 2020-02-18 RX ADMIN — TAMSULOSIN HYDROCHLORIDE 0.4 MILLIGRAM(S): 0.4 CAPSULE ORAL at 20:24

## 2020-02-19 PROCEDURE — 90853 GROUP PSYCHOTHERAPY: CPT

## 2020-02-19 PROCEDURE — 99232 SBSQ HOSP IP/OBS MODERATE 35: CPT | Mod: GC

## 2020-02-19 RX ADMIN — RISPERIDONE 2 MILLIGRAM(S): 4 TABLET ORAL at 20:36

## 2020-02-19 RX ADMIN — OLANZAPINE 30 MILLIGRAM(S): 15 TABLET, FILM COATED ORAL at 20:36

## 2020-02-19 RX ADMIN — Medication 1 TABLET(S): at 08:49

## 2020-02-19 RX ADMIN — TAMSULOSIN HYDROCHLORIDE 0.4 MILLIGRAM(S): 0.4 CAPSULE ORAL at 20:37

## 2020-02-19 RX ADMIN — Medication 162 MILLIGRAM(S): at 08:49

## 2020-02-19 RX ADMIN — Medication 50 MILLIGRAM(S): at 08:49

## 2020-02-19 RX ADMIN — Medication 100 MILLIGRAM(S): at 20:37

## 2020-02-20 PROCEDURE — 99232 SBSQ HOSP IP/OBS MODERATE 35: CPT | Mod: GC

## 2020-02-20 RX ADMIN — OLANZAPINE 30 MILLIGRAM(S): 15 TABLET, FILM COATED ORAL at 20:56

## 2020-02-20 RX ADMIN — Medication 162 MILLIGRAM(S): at 09:07

## 2020-02-20 RX ADMIN — Medication 1 TABLET(S): at 09:07

## 2020-02-20 RX ADMIN — RISPERIDONE 2 MILLIGRAM(S): 4 TABLET ORAL at 20:56

## 2020-02-20 RX ADMIN — Medication 50 MILLIGRAM(S): at 09:07

## 2020-02-20 RX ADMIN — Medication 100 MILLIGRAM(S): at 20:56

## 2020-02-20 RX ADMIN — TAMSULOSIN HYDROCHLORIDE 0.4 MILLIGRAM(S): 0.4 CAPSULE ORAL at 20:56

## 2020-02-21 PROCEDURE — 99232 SBSQ HOSP IP/OBS MODERATE 35: CPT | Mod: GC

## 2020-02-21 RX ADMIN — Medication 100 MILLIGRAM(S): at 20:58

## 2020-02-21 RX ADMIN — TAMSULOSIN HYDROCHLORIDE 0.4 MILLIGRAM(S): 0.4 CAPSULE ORAL at 20:58

## 2020-02-21 RX ADMIN — Medication 50 MILLIGRAM(S): at 09:05

## 2020-02-21 RX ADMIN — RISPERIDONE 2 MILLIGRAM(S): 4 TABLET ORAL at 20:58

## 2020-02-21 RX ADMIN — OLANZAPINE 30 MILLIGRAM(S): 15 TABLET, FILM COATED ORAL at 20:58

## 2020-02-21 RX ADMIN — SENNA PLUS 2 TABLET(S): 8.6 TABLET ORAL at 20:58

## 2020-02-21 RX ADMIN — Medication 1 TABLET(S): at 09:05

## 2020-02-21 RX ADMIN — Medication 162 MILLIGRAM(S): at 09:05

## 2020-02-22 RX ADMIN — Medication 0.4 MILLIGRAM(S): at 23:37

## 2020-02-22 RX ADMIN — TAMSULOSIN HYDROCHLORIDE 0.4 MILLIGRAM(S): 0.4 CAPSULE ORAL at 20:57

## 2020-02-22 RX ADMIN — RISPERIDONE 2 MILLIGRAM(S): 4 TABLET ORAL at 20:57

## 2020-02-22 RX ADMIN — Medication 100 MILLIGRAM(S): at 20:57

## 2020-02-22 RX ADMIN — OLANZAPINE 30 MILLIGRAM(S): 15 TABLET, FILM COATED ORAL at 20:57

## 2020-02-22 RX ADMIN — Medication 162 MILLIGRAM(S): at 08:56

## 2020-02-22 RX ADMIN — Medication 50 MILLIGRAM(S): at 08:56

## 2020-02-23 RX ADMIN — Medication 1 TABLET(S): at 09:31

## 2020-02-23 RX ADMIN — Medication 162 MILLIGRAM(S): at 09:30

## 2020-02-23 RX ADMIN — OLANZAPINE 30 MILLIGRAM(S): 15 TABLET, FILM COATED ORAL at 20:54

## 2020-02-23 RX ADMIN — RISPERIDONE 2 MILLIGRAM(S): 4 TABLET ORAL at 20:54

## 2020-02-23 RX ADMIN — Medication 50 MILLIGRAM(S): at 09:31

## 2020-02-23 RX ADMIN — TAMSULOSIN HYDROCHLORIDE 0.4 MILLIGRAM(S): 0.4 CAPSULE ORAL at 20:54

## 2020-02-23 RX ADMIN — Medication 100 MILLIGRAM(S): at 20:54

## 2020-02-24 VITALS — TEMPERATURE: 99 F

## 2020-02-24 PROCEDURE — 99232 SBSQ HOSP IP/OBS MODERATE 35: CPT | Mod: GC

## 2020-02-24 PROCEDURE — 93010 ELECTROCARDIOGRAM REPORT: CPT

## 2020-02-24 RX ORDER — RISPERIDONE 4 MG/1
1 TABLET ORAL
Qty: 0 | Refills: 0 | DISCHARGE
Start: 2020-02-24

## 2020-02-24 RX ORDER — SENNA PLUS 8.6 MG/1
2 TABLET ORAL
Qty: 0 | Refills: 0 | DISCHARGE
Start: 2020-02-24

## 2020-02-24 RX ORDER — TOPIRAMATE 25 MG
1 TABLET ORAL
Qty: 0 | Refills: 0 | DISCHARGE
Start: 2020-02-24

## 2020-02-24 RX ORDER — OLANZAPINE 15 MG/1
2 TABLET, FILM COATED ORAL
Qty: 0 | Refills: 0 | DISCHARGE
Start: 2020-02-24

## 2020-02-24 RX ORDER — TAMSULOSIN HYDROCHLORIDE 0.4 MG/1
1 CAPSULE ORAL
Qty: 0 | Refills: 0 | DISCHARGE
Start: 2020-02-24

## 2020-02-24 RX ORDER — RISPERIDONE 4 MG/1
0 TABLET ORAL
Qty: 0 | Refills: 0 | DISCHARGE

## 2020-02-24 RX ORDER — ASPIRIN/CALCIUM CARB/MAGNESIUM 324 MG
2 TABLET ORAL
Qty: 0 | Refills: 0 | DISCHARGE
Start: 2020-02-24

## 2020-02-24 RX ORDER — MECLIZINE HCL 12.5 MG
1 TABLET ORAL
Qty: 0 | Refills: 0 | DISCHARGE

## 2020-02-24 RX ORDER — FAMOTIDINE 10 MG/ML
1 INJECTION INTRAVENOUS
Qty: 0 | Refills: 0 | DISCHARGE

## 2020-02-24 RX ORDER — NITROGLYCERIN 6.5 MG
0 CAPSULE, EXTENDED RELEASE ORAL
Qty: 0 | Refills: 0 | DISCHARGE

## 2020-02-24 RX ORDER — TAMSULOSIN HYDROCHLORIDE 0.4 MG/1
1 CAPSULE ORAL
Qty: 0 | Refills: 0 | DISCHARGE

## 2020-02-24 RX ORDER — ASPIRIN/CALCIUM CARB/MAGNESIUM 324 MG
1 TABLET ORAL
Qty: 0 | Refills: 0 | DISCHARGE

## 2020-02-24 RX ORDER — OLANZAPINE 15 MG/1
2 TABLET, FILM COATED ORAL
Qty: 0 | Refills: 0 | DISCHARGE

## 2020-02-24 RX ORDER — OLANZAPINE 15 MG/1
1 TABLET, FILM COATED ORAL
Qty: 0 | Refills: 0 | DISCHARGE

## 2020-02-24 RX ORDER — TOPIRAMATE 25 MG
0 TABLET ORAL
Qty: 0 | Refills: 0 | DISCHARGE

## 2020-02-24 RX ORDER — DOCUSATE SODIUM 100 MG
1 CAPSULE ORAL
Qty: 0 | Refills: 0 | DISCHARGE

## 2020-02-24 RX ORDER — OLANZAPINE 15 MG/1
0 TABLET, FILM COATED ORAL
Qty: 0 | Refills: 0 | DISCHARGE

## 2020-02-24 RX ORDER — SENNA PLUS 8.6 MG/1
2 TABLET ORAL
Qty: 0 | Refills: 0 | DISCHARGE

## 2020-02-24 RX ORDER — TOPIRAMATE 25 MG
1 TABLET ORAL
Qty: 0 | Refills: 0 | DISCHARGE

## 2020-02-24 RX ADMIN — Medication 1 TABLET(S): at 08:54

## 2020-02-24 RX ADMIN — Medication 50 MILLIGRAM(S): at 08:54

## 2020-02-24 RX ADMIN — OLANZAPINE 30 MILLIGRAM(S): 15 TABLET, FILM COATED ORAL at 21:11

## 2020-02-24 RX ADMIN — RISPERIDONE 2 MILLIGRAM(S): 4 TABLET ORAL at 21:11

## 2020-02-24 RX ADMIN — TAMSULOSIN HYDROCHLORIDE 0.4 MILLIGRAM(S): 0.4 CAPSULE ORAL at 21:11

## 2020-02-24 RX ADMIN — Medication 162 MILLIGRAM(S): at 08:54

## 2020-02-24 RX ADMIN — Medication 100 MILLIGRAM(S): at 21:11

## 2020-02-24 RX ADMIN — MAGNESIUM HYDROXIDE 30 MILLILITER(S): 400 TABLET, CHEWABLE ORAL at 20:36

## 2020-02-25 PROCEDURE — 99232 SBSQ HOSP IP/OBS MODERATE 35: CPT

## 2020-02-25 RX ORDER — IPRATROPIUM/ALBUTEROL SULFATE 18-103MCG
3 AEROSOL WITH ADAPTER (GRAM) INHALATION ONCE
Refills: 0 | Status: COMPLETED | OUTPATIENT
Start: 2020-02-25 | End: 2020-02-25

## 2020-02-25 RX ORDER — ALBUTEROL 90 UG/1
2.5 AEROSOL, METERED ORAL ONCE
Refills: 0 | Status: DISCONTINUED | OUTPATIENT
Start: 2020-02-25 | End: 2020-02-25

## 2020-02-25 RX ADMIN — Medication 1 TABLET(S): at 09:05

## 2020-02-25 RX ADMIN — Medication 50 MILLIGRAM(S): at 09:05

## 2020-02-25 RX ADMIN — Medication 162 MILLIGRAM(S): at 09:05

## 2020-02-25 RX ADMIN — Medication 30 MILLILITER(S): at 00:21

## 2020-02-25 RX ADMIN — Medication 3 MILLILITER(S): at 00:57

## 2022-08-23 NOTE — ED BEHAVIORAL HEALTH ASSESSMENT NOTE - JUDGMENT (REGARDING EVERYDAY EVENTS)

## 2023-07-15 NOTE — ED ADULT NURSE NOTE - NS_BH TRG QUESTION6_ED_ALL_ED
patient reports migraine for 3 days, has a history of such has tried abortive medications at home without relief.   
No

## 2023-11-17 NOTE — ED BEHAVIORAL HEALTH ASSESSMENT NOTE - NS ED [facility] [employer] MEDICAL RECORD REVIEWED CONSENT OBTAINED YN
Provider paged re:  Is pt able to eat? thanks    
Suki Barry VENKAT paged via [employer] @ [phone]    Lucian Oliveira critical Hgb 6.9 but improved from 5.8 after 1 unit [facility]  AURORA Corrigan  [phone] if questions    Meenu Chowdhury RN on 11/17/2023 at 7:02 AM    
No

## 2024-10-01 NOTE — ED PROVIDER NOTE - DIAGNOSIS COUNSELING, MDM
Your hemoglobin is low however stable.  We will continue to monitor.  May consider taking over the counter Iron supplements  Your platelets are normal.  Your electrolytes are stable.  Your glucose is abnormal.  Your Hemoglobin A1C is abnormal.  You do have prediabetes.  You need to reduce carbohydrate intake.  Diet and Exercise.  Your kidney function is stable.    Your liver function is stable.  Your cholesterol panel is abnormal.  We encourage you to diet and exercise.   Your TSH is normal.   Your vitamin D level is low. You can take over the counter Vitamin D supplements.  You can take vitamin D 1000, 2000, or 3000 IU once a day.  Your vitamin B12 is normal.  Your folic acid is normal  Your urine test is stable.    Your PSA level is normal.      Schedule follow up: in 6 months conducted a detailed discussion... I had a detailed discussion with the patient and/or guardian regarding the historical points, exam findings, and any diagnostic results supporting the discharge/admit diagnosis.